# Patient Record
Sex: FEMALE | Race: WHITE | NOT HISPANIC OR LATINO | Employment: UNEMPLOYED | URBAN - METROPOLITAN AREA
[De-identification: names, ages, dates, MRNs, and addresses within clinical notes are randomized per-mention and may not be internally consistent; named-entity substitution may affect disease eponyms.]

---

## 2020-03-22 ENCOUNTER — HOSPITAL ENCOUNTER (EMERGENCY)
Facility: HOSPITAL | Age: 35
Discharge: HOME | End: 2020-03-22
Attending: EMERGENCY MEDICINE
Payer: MEDICAID

## 2020-03-22 VITALS
HEIGHT: 64 IN | BODY MASS INDEX: 23.39 KG/M2 | OXYGEN SATURATION: 100 % | TEMPERATURE: 99 F | HEART RATE: 112 BPM | DIASTOLIC BLOOD PRESSURE: 105 MMHG | WEIGHT: 137 LBS | SYSTOLIC BLOOD PRESSURE: 157 MMHG | RESPIRATION RATE: 18 BRPM

## 2020-03-22 DIAGNOSIS — Z13.9 ENCOUNTER FOR MEDICAL SCREENING EXAMINATION: Primary | ICD-10-CM

## 2020-03-22 DIAGNOSIS — R51.9 NONINTRACTABLE HEADACHE, UNSPECIFIED CHRONICITY PATTERN, UNSPECIFIED HEADACHE TYPE: ICD-10-CM

## 2020-03-22 DIAGNOSIS — I10 ESSENTIAL HYPERTENSION: ICD-10-CM

## 2020-03-22 PROCEDURE — 25000003 PHARM REV CODE 250: Performed by: EMERGENCY MEDICINE

## 2020-03-22 PROCEDURE — 99283 EMERGENCY DEPT VISIT LOW MDM: CPT

## 2020-03-22 RX ORDER — ACETAMINOPHEN 500 MG
1000 TABLET ORAL
Status: COMPLETED | OUTPATIENT
Start: 2020-03-22 | End: 2020-03-22

## 2020-03-22 RX ADMIN — ACETAMINOPHEN 1000 MG: 500 TABLET, FILM COATED ORAL at 09:03

## 2020-03-23 NOTE — ED NOTES
APPEARANCE: Alert, oriented and in no acute distress.  PERIPHERAL VASCULAR: peripheral pulses present. Normal cap refill. No edema. Warm to touch.    RESPIRATORY:Normal rate and effort, breath sounds clear bilaterally throughout chest. Respirations are equal and unlabored no obvious signs of distress.  GASTRO: soft, bowel sounds normal, no tenderness, no abdominal distention.  MUSC: Full ROM. No bony tenderness or soft tissue tenderness.  SKIN: Skin is warm and dry, normal skin turgor, mucous membranes moist.  NEURO: 5/5 strength major flexors/extensors bilaterally. Sensory intact to light touch bilaterally. Imladis coma scale: eyes open spontaneously-4, oriented & converses-5, obeys commands-6.   MENTAL STATUS: awake, alert and aware of environment.  EYE: PERRL, both eyes: pupils brisk and reactive to light. Normal size.

## 2020-03-23 NOTE — ED TRIAGE NOTES
Pt presents to the ER and states mother found her passed out in the living room on the floor and was brought here by EMS.  Pt states she has no problems and her mom is making a big deal out of nothing.  Denies drug or alcohol use.

## 2020-03-24 ENCOUNTER — HOSPITAL ENCOUNTER (EMERGENCY)
Facility: HOSPITAL | Age: 35
Discharge: PSYCHIATRIC OR SUBSTANCE USE FACILITY | End: 2020-03-25
Attending: EMERGENCY MEDICINE
Payer: MEDICAID

## 2020-03-24 DIAGNOSIS — R45.851 SUICIDAL IDEATION: Primary | ICD-10-CM

## 2020-03-24 DIAGNOSIS — T18.9XXA INGESTION OF FOREIGN SUBSTANCE: ICD-10-CM

## 2020-03-24 LAB
ALBUMIN SERPL BCP-MCNC: 4.4 G/DL (ref 3.5–5.2)
ALP SERPL-CCNC: 75 U/L (ref 55–135)
ALT SERPL W/O P-5'-P-CCNC: 17 U/L (ref 10–44)
AMPHET+METHAMPHET UR QL: NEGATIVE
ANION GAP SERPL CALC-SCNC: 17 MMOL/L (ref 8–16)
APAP SERPL-MCNC: <3 UG/ML (ref 10–20)
AST SERPL-CCNC: 18 U/L (ref 10–40)
B-HCG UR QL: NEGATIVE
BACTERIA #/AREA URNS AUTO: NORMAL /HPF
BARBITURATES UR QL SCN>200 NG/ML: NEGATIVE
BASOPHILS # BLD AUTO: 0.03 K/UL (ref 0–0.2)
BASOPHILS NFR BLD: 0.3 % (ref 0–1.9)
BENZODIAZ UR QL SCN>200 NG/ML: NORMAL
BILIRUB SERPL-MCNC: 0.3 MG/DL (ref 0.1–1)
BILIRUB UR QL STRIP: NEGATIVE
BUN SERPL-MCNC: 5 MG/DL (ref 6–20)
BZE UR QL SCN: NEGATIVE
CALCIUM SERPL-MCNC: 10 MG/DL (ref 8.7–10.5)
CANNABINOIDS UR QL SCN: NORMAL
CHLORIDE SERPL-SCNC: 107 MMOL/L (ref 95–110)
CLARITY UR REFRACT.AUTO: CLEAR
CO2 SERPL-SCNC: 20 MMOL/L (ref 23–29)
COLOR UR AUTO: ABNORMAL
CREAT SERPL-MCNC: 1 MG/DL (ref 0.5–1.4)
CREAT UR-MCNC: 49 MG/DL (ref 15–325)
CTP QC/QA: YES
DIFFERENTIAL METHOD: ABNORMAL
EOSINOPHIL # BLD AUTO: 0 K/UL (ref 0–0.5)
EOSINOPHIL NFR BLD: 0.1 % (ref 0–8)
ERYTHROCYTE [DISTWIDTH] IN BLOOD BY AUTOMATED COUNT: 14.1 % (ref 11.5–14.5)
EST. GFR  (AFRICAN AMERICAN): >60 ML/MIN/1.73 M^2
EST. GFR  (NON AFRICAN AMERICAN): >60 ML/MIN/1.73 M^2
ETHANOL SERPL-MCNC: 22 MG/DL
GLUCOSE SERPL-MCNC: 122 MG/DL (ref 70–110)
GLUCOSE UR QL STRIP: NEGATIVE
HCT VFR BLD AUTO: 47.6 % (ref 37–48.5)
HGB BLD-MCNC: 15.2 G/DL (ref 12–16)
HGB UR QL STRIP: ABNORMAL
IMM GRANULOCYTES # BLD AUTO: 0.05 K/UL (ref 0–0.04)
IMM GRANULOCYTES NFR BLD AUTO: 0.4 % (ref 0–0.5)
KETONES UR QL STRIP: NEGATIVE
LEUKOCYTE ESTERASE UR QL STRIP: NEGATIVE
LYMPHOCYTES # BLD AUTO: 1.2 K/UL (ref 1–4.8)
LYMPHOCYTES NFR BLD: 10.1 % (ref 18–48)
MCH RBC QN AUTO: 30.4 PG (ref 27–31)
MCHC RBC AUTO-ENTMCNC: 31.9 G/DL (ref 32–36)
MCV RBC AUTO: 95 FL (ref 82–98)
METHADONE UR QL SCN>300 NG/ML: NEGATIVE
MICROSCOPIC COMMENT: NORMAL
MONOCYTES # BLD AUTO: 0.5 K/UL (ref 0.3–1)
MONOCYTES NFR BLD: 3.9 % (ref 4–15)
NEUTROPHILS # BLD AUTO: 10.1 K/UL (ref 1.8–7.7)
NEUTROPHILS NFR BLD: 85.2 % (ref 38–73)
NITRITE UR QL STRIP: NEGATIVE
NRBC BLD-RTO: 0 /100 WBC
OPIATES UR QL SCN: NORMAL
PCP UR QL SCN>25 NG/ML: NEGATIVE
PH UR STRIP: 7 [PH] (ref 5–8)
PLATELET # BLD AUTO: 373 K/UL (ref 150–350)
PMV BLD AUTO: 8.5 FL (ref 9.2–12.9)
POTASSIUM SERPL-SCNC: 3.2 MMOL/L (ref 3.5–5.1)
PROT SERPL-MCNC: 8.3 G/DL (ref 6–8.4)
PROT UR QL STRIP: NEGATIVE
RBC # BLD AUTO: 5 M/UL (ref 4–5.4)
RBC #/AREA URNS AUTO: 3 /HPF (ref 0–4)
SALICYLATES SERPL-MCNC: <5 MG/DL (ref 15–30)
SODIUM SERPL-SCNC: 144 MMOL/L (ref 136–145)
SP GR UR STRIP: 1.01 (ref 1–1.03)
TOXICOLOGY INFORMATION: NORMAL
TSH SERPL DL<=0.005 MIU/L-ACNC: 1.18 UIU/ML (ref 0.4–4)
URN SPEC COLLECT METH UR: ABNORMAL
WBC # BLD AUTO: 11.85 K/UL (ref 3.9–12.7)
WBC #/AREA URNS AUTO: 0 /HPF (ref 0–5)

## 2020-03-24 PROCEDURE — 84443 ASSAY THYROID STIM HORMONE: CPT

## 2020-03-24 PROCEDURE — 93005 ELECTROCARDIOGRAM TRACING: CPT

## 2020-03-24 PROCEDURE — 80320 DRUG SCREEN QUANTALCOHOLS: CPT

## 2020-03-24 PROCEDURE — 99285 PR EMERGENCY DEPT VISIT,LEVEL V: ICD-10-PCS | Mod: ,,, | Performed by: EMERGENCY MEDICINE

## 2020-03-24 PROCEDURE — 85025 COMPLETE CBC W/AUTO DIFF WBC: CPT

## 2020-03-24 PROCEDURE — 63600175 PHARM REV CODE 636 W HCPCS: Performed by: EMERGENCY MEDICINE

## 2020-03-24 PROCEDURE — 96372 THER/PROPH/DIAG INJ SC/IM: CPT

## 2020-03-24 PROCEDURE — 99285 EMERGENCY DEPT VISIT HI MDM: CPT | Mod: 25

## 2020-03-24 PROCEDURE — 80053 COMPREHEN METABOLIC PANEL: CPT

## 2020-03-24 PROCEDURE — 81025 URINE PREGNANCY TEST: CPT | Performed by: EMERGENCY MEDICINE

## 2020-03-24 PROCEDURE — 81001 URINALYSIS AUTO W/SCOPE: CPT

## 2020-03-24 PROCEDURE — 93010 EKG 12-LEAD: ICD-10-PCS | Mod: ,,, | Performed by: INTERNAL MEDICINE

## 2020-03-24 PROCEDURE — 80307 DRUG TEST PRSMV CHEM ANLYZR: CPT

## 2020-03-24 PROCEDURE — 80329 ANALGESICS NON-OPIOID 1 OR 2: CPT

## 2020-03-24 PROCEDURE — 93010 ELECTROCARDIOGRAM REPORT: CPT | Mod: ,,, | Performed by: INTERNAL MEDICINE

## 2020-03-24 PROCEDURE — 99285 EMERGENCY DEPT VISIT HI MDM: CPT | Mod: ,,, | Performed by: EMERGENCY MEDICINE

## 2020-03-24 PROCEDURE — 25000003 PHARM REV CODE 250: Performed by: EMERGENCY MEDICINE

## 2020-03-24 RX ORDER — LISINOPRIL 20 MG/1
20 TABLET ORAL
Status: COMPLETED | OUTPATIENT
Start: 2020-03-24 | End: 2020-03-24

## 2020-03-24 RX ORDER — HALOPERIDOL 5 MG/ML
5 INJECTION INTRAMUSCULAR
Status: COMPLETED | OUTPATIENT
Start: 2020-03-24 | End: 2020-03-24

## 2020-03-24 RX ORDER — IBUPROFEN 200 MG
1 TABLET ORAL DAILY
Status: DISCONTINUED | OUTPATIENT
Start: 2020-03-25 | End: 2020-03-25 | Stop reason: HOSPADM

## 2020-03-24 RX ADMIN — LISINOPRIL 20 MG: 20 TABLET ORAL at 08:03

## 2020-03-24 RX ADMIN — HALOPERIDOL LACTATE 5 MG: 5 INJECTION, SOLUTION INTRAMUSCULAR at 10:03

## 2020-03-24 RX ADMIN — LORAZEPAM 2 MG: 2 INJECTION INTRAMUSCULAR; INTRAVENOUS at 10:03

## 2020-03-24 NOTE — ED NOTES
Pt remains in paper scrubs, resting in stretcher comfortably. No signs of distress noted. Sitter remains at bedside in direct visual contact, charting per protocol every 15 minutes. Pt remains on cardiac, bp and o2 monitor per md order.  aware of plan of care. Will continue to monitor.

## 2020-03-24 NOTE — ED NOTES
Pt identifiers Sabina Whitten were checked and are correct  LOC: The patient is lethargic and will open her eyes when name is called   APPEARANCE: Pt resting comfortably, in no acute distress, pt is clean and well groomed, clothing properly fastened  SKIN: Skin warm, dry and intact, normal skin turgor, moist mucus membranes Abrasion noted to left shoulder , healing lacerations noted to right wrist   RESPIRATORY: Airway is open and patent, respirations are spontaneous, even and unlabored, normal effort and rate Gurgling auscultated to left upper lung lobe, breath sounds diminished to RUL, RLL, and to LLL  CARDIAC: Normal rate and rhythm, swelling noted to isaac ds , capillary refill < 3 seconds, bilateral radial pulses 2+  ABDOMEN: Soft, nontender, nondistended. Bowel sounds present to all four quad of abd on ausucultation  NEUROLOGIC: PERRL approx 4mm in size with brisk reaction to light , facial expression is symmetrical, patient moving all extremities spontaneously, .  Follows all commands appropriately when asked to squeeze hand   MUSCULOSKELETAL: No obvious deformities.

## 2020-03-24 NOTE — ED NOTES
Care assumed. Patient changed into paper scrubs, personal belongings removed from room, labeled and locked in EMS room. Pt remains on cardica, bp and o2 monitor per MD orders. Sitter at bedside to monitor and record patient activities at least every 15 minutes, while maintaining direct visual contact with patient. The sitter has no personal belongings inside the room.

## 2020-03-24 NOTE — ED TRIAGE NOTES
Pt arrived via stretcher by EMS  EMS reports pt's   one week ago, pt is a former heroine user, pt took and unknown number of unknown pills today , possible injected herself with imitrex and may have drank rubbing alcohol  Pt arrived with emesis in her hair

## 2020-03-24 NOTE — ED PROVIDER NOTES
Encounter Date: 3/22/2020       History     Chief Complaint   Patient presents with    Addiction Problem     Patient presented to the ED with mother. Patient presents to the ED with reports of feeling dehydrated and weak. Mother reports patient with suspected substance problem.     Dehydration    Weakness     HPI   This is a 35 y.o. female who has a past medical history of Anxiety, Fractured coccyx, and Hypertension.     The patient presents to the Emergency Department for a screening exam.  Patient's mother apparently called after she found patient on the floor after she fell off the couch.  Patient states that she was sleeping and rolled off the couch.  Patient denies any alcohol or drug use.  She denies any anxiety or depression. Patient denies any homicidal or suicidal ideations.    She endorses only a mild headache at this time.  No other complaints.      Review of patient's allergies indicates:   Allergen Reactions    Morphine Other (See Comments)     Dizzy and sick to stomach    Tramadol Rash     Past Medical History:   Diagnosis Date    Anxiety     Fractured coccyx     Hypertension      No past surgical history on file.  No family history on file.  Social History     Tobacco Use    Smoking status: Never Smoker   Substance Use Topics    Alcohol use: No    Drug use: No     Review of Systems   Constitutional: Negative for activity change and fever.   Respiratory: Negative for cough.    Gastrointestinal: Negative for abdominal pain, diarrhea and nausea.   Genitourinary: Negative for dysuria.   Neurological: Positive for headaches.   Psychiatric/Behavioral: The patient is not nervous/anxious.    All other systems reviewed and are negative.      Physical Exam     Initial Vitals [03/22/20 2050]   BP Pulse Resp Temp SpO2   (!) 164/101 (!) 112 18 99.4 °F (37.4 °C) 100 %      MAP       --         Physical Exam    Nursing note and vitals reviewed.  Constitutional: She appears well-developed and  well-nourished. She is not diaphoretic. No distress.   HENT:   Head: Normocephalic and atraumatic.   Mouth/Throat: Oropharynx is clear and moist.   Eyes: Conjunctivae are normal. Pupils are equal, round, and reactive to light.   Neck: Normal range of motion. Neck supple.   Cardiovascular: Normal rate, regular rhythm, normal heart sounds and intact distal pulses.   Pulmonary/Chest: Breath sounds normal. No respiratory distress. She has no wheezes. She has no rhonchi. She has no rales.   Abdominal: Soft. Bowel sounds are normal. She exhibits no distension. There is no tenderness. There is no guarding.   Musculoskeletal: Normal range of motion. She exhibits no edema or tenderness.   Neurological: She is alert and oriented to person, place, and time. She has normal strength. GCS score is 15. GCS eye subscore is 4. GCS verbal subscore is 5. GCS motor subscore is 6.   Skin: Skin is warm and dry. Capillary refill takes less than 2 seconds. No rash noted.   Psychiatric: She has a normal mood and affect. Thought content normal.         ED Course   Procedures  Labs Reviewed - No data to display       Imaging Results    None          Medical Decision Making:   Initial Assessment:   This is an emergent evaluation of a 35 y.o.female patient who presents for medical screening exam per mother who called 911 for suspected drug abuse.  The patient appears alert and oriented x3, no acute distress, does not appear intoxicated at this time.  She does not have any homicidal or suicidal ideation, nor does she endorse any other complaints besides a minor headache.  No fever or cough, no other acute illness concerning for infectious etiology.  I will discharge the patient at this time.  Patient can follow-up with her primary care provider or the referred clinic as needed.                                 Clinical Impression:       ICD-10-CM ICD-9-CM   1. Encounter for medical screening examination Z13.9 V82.9   2. Nonintractable headache,  unspecified chronicity pattern, unspecified headache type R51 784.0   3. Essential hypertension I10 401.9             ED Disposition Condition    Discharge Stable        ED Prescriptions     None        Follow-up Information     Follow up With Specialties Details Why Contact Info    [facility] - [facility] Medicine Family Medicine Schedule an appointment as soon as possible for a visit   [address] [place] [address]  [place] [address]  [phone]                                       Aleksander Boggs MD  03/23/20 1553

## 2020-03-24 NOTE — ED PROVIDER NOTES
Encounter Date: 3/24/2020       History     Chief Complaint   Patient presents with    Suicidal     arrived [facility] wtih c/o overdose on unknown pills witnessed by family     HPI      this is a 35-year-old Woman  Who presents with altered mental status.  She was seen in outside emergency department yesterday with headache, and her mother expressed concern about possible substance use.  Patient denied this per the medical record.  Per EMS, the patient was found down at her home, that is unsure if there was trauma.  Fingerstick glucose was within normal limits.  She was arousable to voice, but otherwise unable to provide history, they placed a left lower extremity PIV.  History is limited as the patient is minimally responsive on my evaluation.     I discussed the patient with her mother, Roxana No, phone [phone],  He reported that the patient has a history of prior seizure, history of IV drug use, does not use alcohol much, and that she left a suicide note on the dining room table.  Her   about a week ago, patient's mother is not sure of what but she does report that he had multiple organs fail.  The patient has also expressed suicidal ideation to her brother and stepfather.  Review of patient's allergies indicates:   Allergen Reactions    Morphine Other (See Comments)     Dizzy and sick to stomach    Tramadol Rash     Past Medical History:   Diagnosis Date    Anxiety     Fractured coccyx     Hypertension      Past Surgical History:   Procedure Laterality Date    none       History reviewed. No pertinent family history.  Social History     Tobacco Use    Smoking status: Current Some Day Smoker     Types: Cigars    Smokeless tobacco: Never Used   Substance Use Topics    Alcohol use: No    Drug use: No     Review of Systems   Unable to perform ROS: Mental status change       Physical Exam     Initial Vitals [20 1420]   BP Pulse Resp Temp SpO2   (!) 181/116 (!) 114 16 99.4 °F (37.4  °C) 97 %      MAP       --         Physical Exam    Gen:  Resting with eyes closed, arouses to loud voice, but does not follow commands, makes purposeful movements, NAD, well nourished  Eye: sclera anicteric, pupils are equal reactive, 4 mm to 2 mm. EOMI, no conjunctivitis, no periorbital edema  ENT: NCAT, OP clear, neck supple with FROM, no stridor , there is copious sputum within the oropharynx  CVS: RRR, no m/r/g, distal pulses intact/symmetric  Pulm:  rhonchorous breath sounds in the right upper lung anteriorly, otherwise clear no wheezes, rales or rhonchi, no increased work of breathing  Abd: soft, nontender, nondistended, no organomegaly, no CVAT  Ext: no edema, lesions, rashes, or deformity  Neuro: GCS15, moving all extremities, gait intact  Psych: normal affect, cooperative  ED Course   Procedures  Labs Reviewed   CBC W/ AUTO DIFFERENTIAL - Abnormal; Notable for the following components:       Result Value    Mean Corpuscular Hemoglobin Conc 31.9 (*)     Platelets 373 (*)     MPV 8.5 (*)     Gran # (ANC) 10.1 (*)     Immature Grans (Abs) 0.05 (*)     Gran% 85.2 (*)     Lymph% 10.1 (*)     Mono% 3.9 (*)     All other components within normal limits   COMPREHENSIVE METABOLIC PANEL - Abnormal; Notable for the following components:    Potassium 3.2 (*)     CO2 20 (*)     Glucose 122 (*)     BUN, Bld 5 (*)     Anion Gap 17 (*)     All other components within normal limits   URINALYSIS, REFLEX TO URINE CULTURE - Abnormal; Notable for the following components:    Occult Blood UA 1+ (*)     All other components within normal limits    Narrative:     Preferred Collection Type->Urine, Clean Catch   ALCOHOL,MEDICAL (ETHANOL) - Abnormal; Notable for the following components:    Alcohol, Medical, Serum 22 (*)     All other components within normal limits   ACETAMINOPHEN LEVEL - Abnormal; Notable for the following components:    Acetaminophen (Tylenol), Serum <3.0 (*)     All other components within normal limits    SALICYLATE LEVEL - Abnormal; Notable for the following components:    Salicylate Lvl <5.0 (*)     All other components within normal limits   POCT URINE PREGNANCY - Normal   TSH   DRUG SCREEN PANEL, URINE EMERGENCY    Narrative:     Preferred Collection Type->Urine, Clean Catch   URINALYSIS MICROSCOPIC    Narrative:     Preferred Collection Type->Urine, Clean Catch          Imaging Results          X-Ray Chest AP Portable (Final result)  Result time 03/24/20 17:01:50    Final result by Iron Jarvis Jr., MD (03/24/20 17:01:50)                 Impression:      No significant cardiopulmonary abnormality seen.  Lungs are slightly hypoaerated.      Electronically signed by: Iron Jarvis MD  Date:    03/24/2020  Time:    17:01             Narrative:    EXAMINATION:  XR CHEST AP PORTABLE    CLINICAL HISTORY:  altered mental status, R sided rhonchi;    TECHNIQUE:  Single frontal view of the chest was performed.    COMPARISON:  None    FINDINGS:  Monitoring leads are in place.  Heart size pulmonary vessels are normal.  The lungs slightly hypoaerated.  No confluent consolidation.                               CT Head Without Contrast (Final result)  Result time 03/24/20 16:26:49    Final result by Aleksander Diaz DO (03/24/20 16:26:49)                 Impression:      Unremarkable noncontrast CT head as detailed above specifically without evidence for acute intracranial hemorrhage.  Clinical correlation and further evaluation as warranted.      Electronically signed by: Aleksander Diaz DO  Date:    03/24/2020  Time:    16:26             Narrative:    EXAMINATION:  CT HEAD WITHOUT CONTRAST    CLINICAL HISTORY:  Confusion/delirium, altered LOC, unexplained;    TECHNIQUE:  Multiple sequential 5 mm axial images of the head without contrast.  Coronal and sagittal reformatted imaging from the axial acquisition.    COMPARISON:  None    FINDINGS:  There is no evidence for acute intracranial hemorrhage or sulcal effacement.  The  ventricles are normal in size without hydrocephalus.  There is no midline shift or mass effect.  Few patchy ethmoid air cell opacities bilaterally with small opacity left maxillary antra.                                 Medical Decision Making:   History:   I obtained history from: someone other than patient.       <> Summary of History: Pts mother  Old Medical Records: I decided to obtain old medical records.  Initial Assessment:   This is a 35 year old woman who presents after a possible drug overdose, with somnolence on my exam.  Per history obtained from the patient's mother, she has recently suffered a significant loss with the death of her , and has made overt suicidal statements.  Given this I have placed a PEC.  Patient's exam is suggestive of a sedative hypnotic overdose, not overtly opiate given her normal pupils and normal respiratory effort.  I do not suspect anticholinergic, cholinergic or amphetamine toxidrome by exam.  Plan for labs, including Tylenol and salicylate and ETOH level.  We will maintain cardiac monitoring and obtain an EKG to check for arrhythmogenic etiologies of her altered mental status.  While she does not have obvious head trauma, history is limited and I am concerned about occult trauma, so we will also obtain a CT scan of her head.  Clinical Tests:   Lab Tests: Ordered and Reviewed  Radiological Study: Ordered and Reviewed  ED Management:  Labs are notable for a positive ETOH, negative salicylate or Tylenol level.  Her EKG by my independent interpretation does not show interval abnormalities or an R-wave in AVR.  The patient did slowly metabolize, and became more interview bowl, but did not want to cooperate with my interview.  I have maintained the PEC.  Her head CT by my independent interpretation is negative.  Chest x-ray by my independent interpretation does not show aspiration.  She is medically cleared for psychiatric placement.                             Medically  cleared for psychiatry placement: 3/24/2020  5:38 PM    Clinical Impression:       ICD-10-CM ICD-9-CM   1. Suicidal ideation R45.851 V62.84   2. Ingestion of foreign substance T18.9XXA 938             ED Disposition Condition    Transfer to Psych Facility         ED Prescriptions     None        Follow-up Information    None                                    Negrita White MD  03/24/20 5821

## 2020-03-24 NOTE — ED NOTES
LOC: The patient is awake, alert, and aware of environment. The patient is oriented x 3 and speaking appropriately. Pt arouses to painful stimuli  APPEARANCE: No acute distress noted.   PSYCHOSOCIAL: Patient is calm and cooperative.   SKIN: The skin is warm, dry.   RESPIRATORY: Airway is open and patent. Bilateral chest rise and fall. Respirations are spontaneous, even and unlabored. Normal effort and rate noted. No accessory muscle use noted.   CARDIAC: Patient has a normal rate and rhythm. Denies chest pain or SOB.   ABDOMEN: Soft and non tender to palpation. No distention noted.   URINARY:  Voids independently.   NEUROLOGIC: Tolerating saliva secretions well. Able to follow commands, demonstrating ability to actively and appropriately communicate within context of current conversation. Symmetrical facial muscles. Moving all extremities well. Movement is purposeful.   MUSCULOSKELETAL: No obvious deformities noted.

## 2020-03-25 VITALS
DIASTOLIC BLOOD PRESSURE: 69 MMHG | SYSTOLIC BLOOD PRESSURE: 119 MMHG | RESPIRATION RATE: 18 BRPM | TEMPERATURE: 98 F | BODY MASS INDEX: 23.39 KG/M2 | OXYGEN SATURATION: 100 % | HEIGHT: 64 IN | HEART RATE: 76 BPM | WEIGHT: 137 LBS

## 2020-03-25 PROBLEM — F32.A DEPRESSION WITH SUICIDAL IDEATION: Status: ACTIVE | Noted: 2020-03-25

## 2020-03-25 PROBLEM — I10 HTN (HYPERTENSION): Status: ACTIVE | Noted: 2020-03-25

## 2020-03-25 PROBLEM — E87.6 HYPOKALEMIA: Status: ACTIVE | Noted: 2020-03-25

## 2020-03-25 PROBLEM — R45.851 DEPRESSION WITH SUICIDAL IDEATION: Status: ACTIVE | Noted: 2020-03-25

## 2020-03-25 PROBLEM — F17.200 NICOTINE USE DISORDER: Status: ACTIVE | Noted: 2020-03-25

## 2020-03-25 PROBLEM — Z13.9 ENCOUNTER FOR MEDICAL SCREENING EXAMINATION: Status: ACTIVE | Noted: 2020-03-25

## 2020-03-25 NOTE — ED NOTES
Report received from MAY Milner. Care assumed. Pt in paper scrubs at this time and is resting in stretcher eyes closed, no apparent distress noted. Pt arouses to painful touch. Pt became awake, alert, and stated full name and . Pt oriented to person and place.  Respirations even and unlabored and visible chest rise noted. Patient offered bathroom assistance and denies need at this time. Per pt medical status, patient remains on continuous cardiac monitor, automatic blood pressure cuff, and pulse oximeter; suction at bedside, side rails up x 2, head of bed elevated to 75 degrees, bed low and locked. P.E.C. Signed and in chart. All belongings removed from pt environment unless medically necessary. Sitter at bedside charting Q15 minute checks per policy. Sitter does not have belongings in the room.

## 2020-03-25 NOTE — ED NOTES
Pt transported to [facility] with MAY Obregon and security x2. All belongings, original PEC, and transfer paperwork sent with MAY Obregon. Pt to [facility] in paper scrubs and yellow, non-skid fall risk socks in place. Pt AAOx4, no apparent distress noted. Respirations even and unlabored.

## 2020-03-25 NOTE — ED NOTES
Patient acting out trying to pull smoke detector off wall . And screaming at staff about not wanting to be here. Orders noted.

## 2020-03-25 NOTE — ED NOTES
Pt hypertensive at this time. Pt AAOx4 and stated that she takes Lisinopril 20mg twice a day. Pt denies taking BP medications today. Mindi, psych RN requesting that pt receive BP medication prior to being transported to [facility]. Will speak with MD.

## 2020-03-25 NOTE — ED NOTES
Patient transferred to [facility] with security , myself and nurse Armin with 1 bag of belongings. Patient tried to elope.  called and informed of transfer to  2. DVC maintained.

## 2020-03-25 NOTE — ED NOTES
[facility] with security present. Report was called to nurse FRANKLIN Magana with [facility]. DVC maintained. PEC paperwork sent with patient. Patient called  and informed of pending transfer to [facility]. Vitals stable.

## 2020-06-29 PROBLEM — Z13.9 ENCOUNTER FOR MEDICAL SCREENING EXAMINATION: Status: RESOLVED | Noted: 2020-03-25 | Resolved: 2020-06-29

## 2021-11-22 ENCOUNTER — HOSPITAL ENCOUNTER (EMERGENCY)
Facility: HOSPITAL | Age: 36
Discharge: LEFT BEFORE DISCHARGE | End: 2021-11-22
Attending: EMERGENCY MEDICINE
Payer: MEDICAID

## 2021-11-22 VITALS
SYSTOLIC BLOOD PRESSURE: 137 MMHG | OXYGEN SATURATION: 96 % | TEMPERATURE: 98 F | RESPIRATION RATE: 20 BRPM | WEIGHT: 135 LBS | HEART RATE: 118 BPM | BODY MASS INDEX: 24.84 KG/M2 | DIASTOLIC BLOOD PRESSURE: 97 MMHG | HEIGHT: 62 IN

## 2021-11-22 DIAGNOSIS — M25.469 KNEE SWELLING: ICD-10-CM

## 2021-11-22 DIAGNOSIS — R52 PAIN: ICD-10-CM

## 2021-11-22 DIAGNOSIS — T14.8XXA WOUND INFECTION: Primary | ICD-10-CM

## 2021-11-22 DIAGNOSIS — L02.91 ABSCESS: ICD-10-CM

## 2021-11-22 DIAGNOSIS — L08.9 WOUND INFECTION: Primary | ICD-10-CM

## 2021-11-22 LAB
ALBUMIN SERPL BCP-MCNC: 4.7 G/DL (ref 3.5–5.2)
ALP SERPL-CCNC: 83 U/L (ref 55–135)
ALT SERPL W/O P-5'-P-CCNC: 44 U/L (ref 10–44)
ANION GAP SERPL CALC-SCNC: 11 MMOL/L (ref 8–16)
AST SERPL-CCNC: 26 U/L (ref 10–40)
BASOPHILS # BLD AUTO: 0.07 K/UL (ref 0–0.2)
BASOPHILS NFR BLD: 0.6 % (ref 0–1.9)
BILIRUB SERPL-MCNC: 0.8 MG/DL (ref 0.1–1)
BUN SERPL-MCNC: 9 MG/DL (ref 6–20)
CALCIUM SERPL-MCNC: 9.4 MG/DL (ref 8.7–10.5)
CHLORIDE SERPL-SCNC: 98 MMOL/L (ref 95–110)
CO2 SERPL-SCNC: 28 MMOL/L (ref 23–29)
CREAT SERPL-MCNC: 0.9 MG/DL (ref 0.5–1.4)
CRP SERPL-MCNC: 6.1 MG/L (ref 0–8.2)
DIFFERENTIAL METHOD: ABNORMAL
EOSINOPHIL # BLD AUTO: 0 K/UL (ref 0–0.5)
EOSINOPHIL NFR BLD: 0.3 % (ref 0–8)
ERYTHROCYTE [DISTWIDTH] IN BLOOD BY AUTOMATED COUNT: 13.3 % (ref 11.5–14.5)
ERYTHROCYTE [SEDIMENTATION RATE] IN BLOOD BY WESTERGREN METHOD: 9 MM/HR (ref 0–20)
EST. GFR  (AFRICAN AMERICAN): >60 ML/MIN/1.73 M^2
EST. GFR  (NON AFRICAN AMERICAN): >60 ML/MIN/1.73 M^2
GLUCOSE SERPL-MCNC: 97 MG/DL (ref 70–110)
HCT VFR BLD AUTO: 42.9 % (ref 37–48.5)
HGB BLD-MCNC: 15.2 G/DL (ref 12–16)
IMM GRANULOCYTES # BLD AUTO: 0.04 K/UL (ref 0–0.04)
IMM GRANULOCYTES NFR BLD AUTO: 0.3 % (ref 0–0.5)
LYMPHOCYTES # BLD AUTO: 3.4 K/UL (ref 1–4.8)
LYMPHOCYTES NFR BLD: 29.2 % (ref 18–48)
MCH RBC QN AUTO: 36.5 PG (ref 27–31)
MCHC RBC AUTO-ENTMCNC: 35.4 G/DL (ref 32–36)
MCV RBC AUTO: 103 FL (ref 82–98)
MONOCYTES # BLD AUTO: 0.9 K/UL (ref 0.3–1)
MONOCYTES NFR BLD: 7.8 % (ref 4–15)
NEUTROPHILS # BLD AUTO: 7.3 K/UL (ref 1.8–7.7)
NEUTROPHILS NFR BLD: 61.8 % (ref 38–73)
NRBC BLD-RTO: 0 /100 WBC
PLATELET # BLD AUTO: 360 K/UL (ref 150–450)
PMV BLD AUTO: 8.4 FL (ref 9.2–12.9)
POTASSIUM SERPL-SCNC: 3.3 MMOL/L (ref 3.5–5.1)
PROT SERPL-MCNC: 8.1 G/DL (ref 6–8.4)
RBC # BLD AUTO: 4.17 M/UL (ref 4–5.4)
SODIUM SERPL-SCNC: 137 MMOL/L (ref 136–145)
WBC # BLD AUTO: 11.8 K/UL (ref 3.9–12.7)

## 2021-11-22 PROCEDURE — 86140 C-REACTIVE PROTEIN: CPT | Performed by: EMERGENCY MEDICINE

## 2021-11-22 PROCEDURE — 85025 COMPLETE CBC W/AUTO DIFF WBC: CPT | Performed by: EMERGENCY MEDICINE

## 2021-11-22 PROCEDURE — 63600175 PHARM REV CODE 636 W HCPCS: Performed by: EMERGENCY MEDICINE

## 2021-11-22 PROCEDURE — 80053 COMPREHEN METABOLIC PANEL: CPT | Performed by: EMERGENCY MEDICINE

## 2021-11-22 PROCEDURE — 85652 RBC SED RATE AUTOMATED: CPT | Performed by: EMERGENCY MEDICINE

## 2021-11-22 PROCEDURE — 87040 BLOOD CULTURE FOR BACTERIA: CPT | Performed by: EMERGENCY MEDICINE

## 2021-11-22 PROCEDURE — 99284 EMERGENCY DEPT VISIT MOD MDM: CPT | Mod: 25

## 2021-11-22 PROCEDURE — 25000003 PHARM REV CODE 250: Performed by: EMERGENCY MEDICINE

## 2021-11-22 PROCEDURE — 96374 THER/PROPH/DIAG INJ IV PUSH: CPT

## 2021-11-22 RX ORDER — ALPRAZOLAM 1 MG/1
1 TABLET ORAL 2 TIMES DAILY
COMMUNITY
End: 2021-11-26 | Stop reason: CLARIF

## 2021-11-22 RX ORDER — AMOXICILLIN AND CLAVULANATE POTASSIUM 875; 125 MG/1; MG/1
1 TABLET, FILM COATED ORAL 2 TIMES DAILY
Qty: 14 TABLET | Refills: 0 | Status: ON HOLD | OUTPATIENT
Start: 2021-11-22 | End: 2022-08-16 | Stop reason: SDUPTHER

## 2021-11-22 RX ORDER — DOXYCYCLINE 100 MG/1
100 CAPSULE ORAL 2 TIMES DAILY
Qty: 20 CAPSULE | Refills: 0 | Status: SHIPPED | OUTPATIENT
Start: 2021-11-22 | End: 2021-12-02

## 2021-11-22 RX ORDER — ZOLPIDEM TARTRATE 10 MG/1
5 TABLET ORAL NIGHTLY PRN
COMMUNITY
End: 2021-11-26 | Stop reason: CLARIF

## 2021-11-22 RX ORDER — KETOROLAC TROMETHAMINE 30 MG/ML
10 INJECTION, SOLUTION INTRAMUSCULAR; INTRAVENOUS
Status: COMPLETED | OUTPATIENT
Start: 2021-11-22 | End: 2021-11-22

## 2021-11-22 RX ADMIN — SODIUM CHLORIDE 1000 ML: 0.9 INJECTION, SOLUTION INTRAVENOUS at 10:11

## 2021-11-22 RX ADMIN — KETOROLAC TROMETHAMINE 10 MG: 30 INJECTION, SOLUTION INTRAMUSCULAR; INTRAVENOUS at 10:11

## 2021-11-26 ENCOUNTER — HOSPITAL ENCOUNTER (OUTPATIENT)
Facility: HOSPITAL | Age: 36
Discharge: LEFT BEFORE DISCHARGE | End: 2021-11-27
Attending: EMERGENCY MEDICINE | Admitting: INTERNAL MEDICINE
Payer: MEDICAID

## 2021-11-26 DIAGNOSIS — S81.001S: Primary | ICD-10-CM

## 2021-11-26 DIAGNOSIS — I10 PRIMARY HYPERTENSION: ICD-10-CM

## 2021-11-26 DIAGNOSIS — F33.3 MAJOR DEPRESSIVE DISORDER, RECURRENT, SEVERE WITH PSYCHOTIC FEATURES: ICD-10-CM

## 2021-11-26 DIAGNOSIS — R07.9 CHEST PAIN: ICD-10-CM

## 2021-11-26 DIAGNOSIS — D75.89 MACROCYTOSIS WITHOUT ANEMIA: ICD-10-CM

## 2021-11-26 DIAGNOSIS — I10 HYPERTENSION, UNSPECIFIED TYPE: ICD-10-CM

## 2021-11-26 DIAGNOSIS — L08.9 INFECTED WOUND: ICD-10-CM

## 2021-11-26 DIAGNOSIS — A41.9 SEPSIS, DUE TO UNSPECIFIED ORGANISM, UNSPECIFIED WHETHER ACUTE ORGAN DYSFUNCTION PRESENT: ICD-10-CM

## 2021-11-26 DIAGNOSIS — A41.9 SEPSIS WITHOUT ACUTE ORGAN DYSFUNCTION, DUE TO UNSPECIFIED ORGANISM: ICD-10-CM

## 2021-11-26 DIAGNOSIS — T14.8XXA INFECTED WOUND: ICD-10-CM

## 2021-11-26 DIAGNOSIS — Z51.89 VISIT FOR WOUND CHECK: ICD-10-CM

## 2021-11-26 DIAGNOSIS — F13.20 SEVERE BENZODIAZEPINE USE DISORDER: ICD-10-CM

## 2021-11-26 LAB
ALBUMIN SERPL BCP-MCNC: 3.8 G/DL (ref 3.5–5.2)
ALP SERPL-CCNC: 91 U/L (ref 55–135)
ALT SERPL W/O P-5'-P-CCNC: 17 U/L (ref 10–44)
AMPHET+METHAMPHET UR QL: NEGATIVE
ANION GAP SERPL CALC-SCNC: 13 MMOL/L (ref 8–16)
AST SERPL-CCNC: 19 U/L (ref 10–40)
B-HCG UR QL: NEGATIVE
BARBITURATES UR QL SCN>200 NG/ML: NEGATIVE
BASOPHILS # BLD AUTO: 0.06 K/UL (ref 0–0.2)
BASOPHILS NFR BLD: 0.4 % (ref 0–1.9)
BENZODIAZ UR QL SCN>200 NG/ML: ABNORMAL
BILIRUB SERPL-MCNC: 0.5 MG/DL (ref 0.1–1)
BUN SERPL-MCNC: 11 MG/DL (ref 6–20)
BZE UR QL SCN: NEGATIVE
CALCIUM SERPL-MCNC: 8.8 MG/DL (ref 8.7–10.5)
CANNABINOIDS UR QL SCN: NEGATIVE
CHLORIDE SERPL-SCNC: 103 MMOL/L (ref 95–110)
CHOLEST SERPL-MCNC: 190 MG/DL (ref 120–199)
CHOLEST/HDLC SERPL: 4 {RATIO} (ref 2–5)
CO2 SERPL-SCNC: 19 MMOL/L (ref 23–29)
CREAT SERPL-MCNC: 0.8 MG/DL (ref 0.5–1.4)
CREAT UR-MCNC: 9 MG/DL (ref 15–325)
CRP SERPL-MCNC: 13.7 MG/L (ref 0–8.2)
CTP QC/QA: YES
CTP QC/QA: YES
DIFFERENTIAL METHOD: ABNORMAL
EOSINOPHIL # BLD AUTO: 0.1 K/UL (ref 0–0.5)
EOSINOPHIL NFR BLD: 0.5 % (ref 0–8)
ERYTHROCYTE [DISTWIDTH] IN BLOOD BY AUTOMATED COUNT: 13.1 % (ref 11.5–14.5)
ERYTHROCYTE [SEDIMENTATION RATE] IN BLOOD BY WESTERGREN METHOD: 9 MM/HR (ref 0–20)
EST. GFR  (AFRICAN AMERICAN): >60 ML/MIN/1.73 M^2
EST. GFR  (NON AFRICAN AMERICAN): >60 ML/MIN/1.73 M^2
ESTIMATED AVG GLUCOSE: 91 MG/DL (ref 68–131)
ETHANOL UR-MCNC: <10 MG/DL
FOLATE SERPL-MCNC: 3.6 NG/ML (ref 4–24)
GLUCOSE SERPL-MCNC: 99 MG/DL (ref 70–110)
HBA1C MFR BLD: 4.8 % (ref 4–5.6)
HCT VFR BLD AUTO: 40.7 % (ref 37–48.5)
HDLC SERPL-MCNC: 48 MG/DL (ref 40–75)
HDLC SERPL: 25.3 % (ref 20–50)
HGB BLD-MCNC: 14.3 G/DL (ref 12–16)
IMM GRANULOCYTES # BLD AUTO: 0.05 K/UL (ref 0–0.04)
IMM GRANULOCYTES NFR BLD AUTO: 0.4 % (ref 0–0.5)
LACTATE SERPL-SCNC: 0.7 MMOL/L (ref 0.5–2.2)
LACTATE SERPL-SCNC: 1.4 MMOL/L (ref 0.5–2.2)
LDLC SERPL CALC-MCNC: 129.8 MG/DL (ref 63–159)
LYMPHOCYTES # BLD AUTO: 2.7 K/UL (ref 1–4.8)
LYMPHOCYTES NFR BLD: 19.1 % (ref 18–48)
MCH RBC QN AUTO: 36.1 PG (ref 27–31)
MCHC RBC AUTO-ENTMCNC: 35.1 G/DL (ref 32–36)
MCV RBC AUTO: 103 FL (ref 82–98)
METHADONE UR QL SCN>300 NG/ML: NEGATIVE
MONOCYTES # BLD AUTO: 0.8 K/UL (ref 0.3–1)
MONOCYTES NFR BLD: 5.7 % (ref 4–15)
NEUTROPHILS # BLD AUTO: 10.4 K/UL (ref 1.8–7.7)
NEUTROPHILS NFR BLD: 73.9 % (ref 38–73)
NONHDLC SERPL-MCNC: 142 MG/DL
NRBC BLD-RTO: 0 /100 WBC
OPIATES UR QL SCN: NEGATIVE
PCP UR QL SCN>25 NG/ML: NEGATIVE
PLATELET # BLD AUTO: 396 K/UL (ref 150–450)
PMV BLD AUTO: 8.3 FL (ref 9.2–12.9)
POTASSIUM SERPL-SCNC: 4.1 MMOL/L (ref 3.5–5.1)
PROT SERPL-MCNC: 6.9 G/DL (ref 6–8.4)
RBC # BLD AUTO: 3.96 M/UL (ref 4–5.4)
SARS-COV-2 RDRP RESP QL NAA+PROBE: NEGATIVE
SODIUM SERPL-SCNC: 135 MMOL/L (ref 136–145)
TOXICOLOGY INFORMATION: ABNORMAL
TRIGL SERPL-MCNC: 61 MG/DL (ref 30–150)
VIT B12 SERPL-MCNC: 238 PG/ML (ref 210–950)
WBC # BLD AUTO: 14.12 K/UL (ref 3.9–12.7)

## 2021-11-26 PROCEDURE — 63600175 PHARM REV CODE 636 W HCPCS: Performed by: PHYSICIAN ASSISTANT

## 2021-11-26 PROCEDURE — 86140 C-REACTIVE PROTEIN: CPT | Performed by: PHYSICIAN ASSISTANT

## 2021-11-26 PROCEDURE — 82746 ASSAY OF FOLIC ACID SERUM: CPT | Performed by: STUDENT IN AN ORGANIZED HEALTH CARE EDUCATION/TRAINING PROGRAM

## 2021-11-26 PROCEDURE — U0002 COVID-19 LAB TEST NON-CDC: HCPCS | Performed by: PHYSICIAN ASSISTANT

## 2021-11-26 PROCEDURE — G0378 HOSPITAL OBSERVATION PER HR: HCPCS

## 2021-11-26 PROCEDURE — 96375 TX/PRO/DX INJ NEW DRUG ADDON: CPT

## 2021-11-26 PROCEDURE — 87070 CULTURE OTHR SPECIMN AEROBIC: CPT | Mod: 59 | Performed by: SURGERY

## 2021-11-26 PROCEDURE — 96376 TX/PRO/DX INJ SAME DRUG ADON: CPT

## 2021-11-26 PROCEDURE — 25000003 PHARM REV CODE 250: Performed by: INTERNAL MEDICINE

## 2021-11-26 PROCEDURE — 96366 THER/PROPH/DIAG IV INF ADDON: CPT

## 2021-11-26 PROCEDURE — 25000003 PHARM REV CODE 250: Performed by: STUDENT IN AN ORGANIZED HEALTH CARE EDUCATION/TRAINING PROGRAM

## 2021-11-26 PROCEDURE — 82607 VITAMIN B-12: CPT | Performed by: STUDENT IN AN ORGANIZED HEALTH CARE EDUCATION/TRAINING PROGRAM

## 2021-11-26 PROCEDURE — 96372 THER/PROPH/DIAG INJ SC/IM: CPT | Mod: 59

## 2021-11-26 PROCEDURE — 96365 THER/PROPH/DIAG IV INF INIT: CPT

## 2021-11-26 PROCEDURE — 80053 COMPREHEN METABOLIC PANEL: CPT | Performed by: PHYSICIAN ASSISTANT

## 2021-11-26 PROCEDURE — 87075 CULTR BACTERIA EXCEPT BLOOD: CPT | Mod: 59 | Performed by: SURGERY

## 2021-11-26 PROCEDURE — 99285 EMERGENCY DEPT VISIT HI MDM: CPT | Mod: 25

## 2021-11-26 PROCEDURE — 83605 ASSAY OF LACTIC ACID: CPT | Performed by: PHYSICIAN ASSISTANT

## 2021-11-26 PROCEDURE — 63600175 PHARM REV CODE 636 W HCPCS: Performed by: STUDENT IN AN ORGANIZED HEALTH CARE EDUCATION/TRAINING PROGRAM

## 2021-11-26 PROCEDURE — 81025 URINE PREGNANCY TEST: CPT | Performed by: PHYSICIAN ASSISTANT

## 2021-11-26 PROCEDURE — 25000003 PHARM REV CODE 250: Performed by: PHYSICIAN ASSISTANT

## 2021-11-26 PROCEDURE — 87040 BLOOD CULTURE FOR BACTERIA: CPT | Mod: 59 | Performed by: PHYSICIAN ASSISTANT

## 2021-11-26 PROCEDURE — 83605 ASSAY OF LACTIC ACID: CPT | Mod: 91 | Performed by: EMERGENCY MEDICINE

## 2021-11-26 PROCEDURE — 87186 SC STD MICRODIL/AGAR DIL: CPT | Performed by: SURGERY

## 2021-11-26 PROCEDURE — 83036 HEMOGLOBIN GLYCOSYLATED A1C: CPT | Performed by: STUDENT IN AN ORGANIZED HEALTH CARE EDUCATION/TRAINING PROGRAM

## 2021-11-26 PROCEDURE — 80061 LIPID PANEL: CPT | Performed by: STUDENT IN AN ORGANIZED HEALTH CARE EDUCATION/TRAINING PROGRAM

## 2021-11-26 PROCEDURE — 80307 DRUG TEST PRSMV CHEM ANLYZR: CPT | Performed by: STUDENT IN AN ORGANIZED HEALTH CARE EDUCATION/TRAINING PROGRAM

## 2021-11-26 PROCEDURE — 85025 COMPLETE CBC W/AUTO DIFF WBC: CPT | Performed by: PHYSICIAN ASSISTANT

## 2021-11-26 PROCEDURE — 80074 ACUTE HEPATITIS PANEL: CPT | Performed by: STUDENT IN AN ORGANIZED HEALTH CARE EDUCATION/TRAINING PROGRAM

## 2021-11-26 PROCEDURE — 87077 CULTURE AEROBIC IDENTIFY: CPT | Performed by: SURGERY

## 2021-11-26 PROCEDURE — 85652 RBC SED RATE AUTOMATED: CPT | Performed by: PHYSICIAN ASSISTANT

## 2021-11-26 PROCEDURE — 87389 HIV-1 AG W/HIV-1&-2 AB AG IA: CPT | Performed by: STUDENT IN AN ORGANIZED HEALTH CARE EDUCATION/TRAINING PROGRAM

## 2021-11-26 RX ORDER — IBUPROFEN 200 MG
24 TABLET ORAL
Status: DISCONTINUED | OUTPATIENT
Start: 2021-11-26 | End: 2021-11-27 | Stop reason: HOSPADM

## 2021-11-26 RX ORDER — IBUPROFEN 800 MG/1
800 TABLET ORAL 3 TIMES DAILY
COMMUNITY
End: 2021-11-26 | Stop reason: CLARIF

## 2021-11-26 RX ORDER — NALOXONE HCL 0.4 MG/ML
0.02 VIAL (ML) INJECTION
Status: DISCONTINUED | OUTPATIENT
Start: 2021-11-26 | End: 2021-11-27 | Stop reason: HOSPADM

## 2021-11-26 RX ORDER — SODIUM CHLORIDE 0.9 % (FLUSH) 0.9 %
10 SYRINGE (ML) INJECTION EVERY 12 HOURS PRN
Status: DISCONTINUED | OUTPATIENT
Start: 2021-11-26 | End: 2021-11-27 | Stop reason: HOSPADM

## 2021-11-26 RX ORDER — IBUPROFEN 200 MG
16 TABLET ORAL
Status: DISCONTINUED | OUTPATIENT
Start: 2021-11-26 | End: 2021-11-27 | Stop reason: HOSPADM

## 2021-11-26 RX ORDER — ACETAMINOPHEN 325 MG/1
650 TABLET ORAL EVERY 6 HOURS PRN
Status: DISCONTINUED | OUTPATIENT
Start: 2021-11-26 | End: 2021-11-27 | Stop reason: HOSPADM

## 2021-11-26 RX ORDER — VANCOMYCIN HCL IN 5 % DEXTROSE 1G/250ML
1000 PLASTIC BAG, INJECTION (ML) INTRAVENOUS
Status: DISCONTINUED | OUTPATIENT
Start: 2021-11-27 | End: 2021-11-27 | Stop reason: HOSPADM

## 2021-11-26 RX ORDER — METRONIDAZOLE 500 MG/1
500 TABLET ORAL EVERY 8 HOURS
Status: DISCONTINUED | OUTPATIENT
Start: 2021-11-26 | End: 2021-11-27 | Stop reason: HOSPADM

## 2021-11-26 RX ORDER — ENOXAPARIN SODIUM 100 MG/ML
40 INJECTION SUBCUTANEOUS EVERY 24 HOURS
Status: DISCONTINUED | OUTPATIENT
Start: 2021-11-26 | End: 2021-11-27 | Stop reason: HOSPADM

## 2021-11-26 RX ORDER — CEFEPIME HYDROCHLORIDE 1 G/50ML
2 INJECTION, SOLUTION INTRAVENOUS
Status: DISCONTINUED | OUTPATIENT
Start: 2021-11-26 | End: 2021-11-27 | Stop reason: HOSPADM

## 2021-11-26 RX ORDER — GLUCAGON 1 MG
1 KIT INJECTION
Status: DISCONTINUED | OUTPATIENT
Start: 2021-11-26 | End: 2021-11-27 | Stop reason: HOSPADM

## 2021-11-26 RX ORDER — BIOTIN 10 MG
1 TABLET ORAL DAILY
COMMUNITY
End: 2021-11-26 | Stop reason: CLARIF

## 2021-11-26 RX ORDER — KETOROLAC TROMETHAMINE 30 MG/ML
15 INJECTION, SOLUTION INTRAMUSCULAR; INTRAVENOUS
Status: COMPLETED | OUTPATIENT
Start: 2021-11-26 | End: 2021-11-26

## 2021-11-26 RX ADMIN — KETOROLAC TROMETHAMINE 15 MG: 30 INJECTION, SOLUTION INTRAMUSCULAR; INTRAVENOUS at 08:11

## 2021-11-26 RX ADMIN — PIPERACILLIN AND TAZOBACTAM 4.5 G: 4; .5 INJECTION, POWDER, FOR SOLUTION INTRAVENOUS at 08:11

## 2021-11-26 RX ADMIN — CEFEPIME HYDROCHLORIDE 2 G: 2 INJECTION, SOLUTION INTRAVENOUS at 08:11

## 2021-11-26 RX ADMIN — METRONIDAZOLE 500 MG: 500 TABLET ORAL at 09:11

## 2021-11-26 RX ADMIN — VANCOMYCIN HYDROCHLORIDE 1500 MG: 1.5 INJECTION, POWDER, LYOPHILIZED, FOR SOLUTION INTRAVENOUS at 02:11

## 2021-11-26 RX ADMIN — SODIUM CHLORIDE 1000 ML: 0.9 INJECTION, SOLUTION INTRAVENOUS at 08:11

## 2021-11-26 RX ADMIN — CEFEPIME HYDROCHLORIDE 2 G: 2 INJECTION, SOLUTION INTRAVENOUS at 12:11

## 2021-11-26 RX ADMIN — ENOXAPARIN SODIUM 40 MG: 100 INJECTION SUBCUTANEOUS at 04:11

## 2021-11-26 RX ADMIN — METRONIDAZOLE 500 MG: 500 TABLET ORAL at 02:11

## 2021-11-26 RX ADMIN — ACETAMINOPHEN 650 MG: 325 TABLET ORAL at 12:11

## 2021-11-26 RX ADMIN — COLLAGENASE SANTYL: 250 OINTMENT TOPICAL at 04:11

## 2021-11-27 VITALS
BODY MASS INDEX: 26.98 KG/M2 | SYSTOLIC BLOOD PRESSURE: 121 MMHG | DIASTOLIC BLOOD PRESSURE: 82 MMHG | OXYGEN SATURATION: 98 % | HEIGHT: 62 IN | RESPIRATION RATE: 20 BRPM | TEMPERATURE: 98 F | HEART RATE: 79 BPM | WEIGHT: 146.63 LBS

## 2021-11-27 LAB
BACTERIA BLD CULT: NORMAL
BACTERIA BLD CULT: NORMAL

## 2021-11-27 PROCEDURE — 25000003 PHARM REV CODE 250: Performed by: STUDENT IN AN ORGANIZED HEALTH CARE EDUCATION/TRAINING PROGRAM

## 2021-11-27 PROCEDURE — 63600175 PHARM REV CODE 636 W HCPCS: Performed by: STUDENT IN AN ORGANIZED HEALTH CARE EDUCATION/TRAINING PROGRAM

## 2021-11-27 PROCEDURE — 96376 TX/PRO/DX INJ SAME DRUG ADON: CPT

## 2021-11-27 PROCEDURE — G0378 HOSPITAL OBSERVATION PER HR: HCPCS

## 2021-11-27 RX ORDER — FOLIC ACID 1 MG/1
1 TABLET ORAL DAILY
Status: DISCONTINUED | OUTPATIENT
Start: 2021-11-27 | End: 2021-11-27 | Stop reason: HOSPADM

## 2021-11-27 RX ORDER — LANOLIN ALCOHOL/MO/W.PET/CERES
1000 CREAM (GRAM) TOPICAL DAILY
Status: DISCONTINUED | OUTPATIENT
Start: 2021-11-29 | End: 2021-11-27 | Stop reason: HOSPADM

## 2021-11-27 RX ORDER — CYANOCOBALAMIN 1000 UG/ML
1000 INJECTION, SOLUTION INTRAMUSCULAR; SUBCUTANEOUS DAILY
Status: DISCONTINUED | OUTPATIENT
Start: 2021-11-27 | End: 2021-11-27 | Stop reason: HOSPADM

## 2021-11-27 RX ADMIN — VANCOMYCIN HYDROCHLORIDE 1000 MG: 1 INJECTION, POWDER, LYOPHILIZED, FOR SOLUTION INTRAVENOUS at 02:11

## 2021-11-27 RX ADMIN — CEFEPIME HYDROCHLORIDE 2 G: 2 INJECTION, SOLUTION INTRAVENOUS at 05:11

## 2021-11-27 RX ADMIN — METRONIDAZOLE 500 MG: 500 TABLET ORAL at 05:11

## 2021-11-29 LAB — HIV 1+2 AB+HIV1 P24 AG SERPL QL IA: NEGATIVE

## 2021-11-30 ENCOUNTER — TELEPHONE (OUTPATIENT)
Dept: ADMINISTRATIVE | Facility: OTHER | Age: 36
End: 2021-11-30
Payer: MEDICAID

## 2021-11-30 LAB
BACTERIA SPEC AEROBE CULT: ABNORMAL
BACTERIA SPEC ANAEROBE CULT: NORMAL
HAV IGM SERPL QL IA: NEGATIVE
HBV CORE IGM SERPL QL IA: NEGATIVE
HBV SURFACE AG SERPL QL IA: NEGATIVE
HCV AB SERPL QL IA: POSITIVE

## 2021-12-01 LAB
BACTERIA BLD CULT: NORMAL
BACTERIA BLD CULT: NORMAL

## 2021-12-07 ENCOUNTER — TELEPHONE (OUTPATIENT)
Dept: HEPATOLOGY | Facility: CLINIC | Age: 36
End: 2021-12-07
Payer: MEDICAID

## 2022-08-14 ENCOUNTER — HOSPITAL ENCOUNTER (INPATIENT)
Facility: HOSPITAL | Age: 37
LOS: 2 days | Discharge: HOME | DRG: 101 | End: 2022-08-16
Attending: EMERGENCY MEDICINE | Admitting: FAMILY MEDICINE
Payer: MEDICAID

## 2022-08-14 DIAGNOSIS — F13.20 SEVERE BENZODIAZEPINE USE DISORDER: ICD-10-CM

## 2022-08-14 DIAGNOSIS — R56.9 SEIZURE: ICD-10-CM

## 2022-08-14 DIAGNOSIS — R07.9 CHEST PAIN: ICD-10-CM

## 2022-08-14 DIAGNOSIS — E03.9 HYPOTHYROIDISM, UNSPECIFIED TYPE: Primary | ICD-10-CM

## 2022-08-14 DIAGNOSIS — R41.82 AMS (ALTERED MENTAL STATUS): ICD-10-CM

## 2022-08-14 DIAGNOSIS — R56.9 SEIZURES: ICD-10-CM

## 2022-08-14 DIAGNOSIS — G93.40 ENCEPHALOPATHY: ICD-10-CM

## 2022-08-14 LAB
ALBUMIN SERPL BCP-MCNC: 4.2 G/DL (ref 3.5–5.2)
ALP SERPL-CCNC: 64 U/L (ref 55–135)
ALT SERPL W/O P-5'-P-CCNC: 14 U/L (ref 10–44)
ANION GAP SERPL CALC-SCNC: 13 MMOL/L (ref 8–16)
APAP SERPL-MCNC: <3 UG/ML (ref 10–20)
AST SERPL-CCNC: 23 U/L (ref 10–40)
BASOPHILS # BLD AUTO: 0.03 K/UL (ref 0–0.2)
BASOPHILS NFR BLD: 0.2 % (ref 0–1.9)
BILIRUB SERPL-MCNC: 1.3 MG/DL (ref 0.1–1)
BUN SERPL-MCNC: 8 MG/DL (ref 6–20)
CALCIUM SERPL-MCNC: 9.4 MG/DL (ref 8.7–10.5)
CHLORIDE SERPL-SCNC: 104 MMOL/L (ref 95–110)
CO2 SERPL-SCNC: 18 MMOL/L (ref 23–29)
CREAT SERPL-MCNC: 0.8 MG/DL (ref 0.5–1.4)
DIFFERENTIAL METHOD: ABNORMAL
EOSINOPHIL # BLD AUTO: 0 K/UL (ref 0–0.5)
EOSINOPHIL NFR BLD: 0 % (ref 0–8)
ERYTHROCYTE [DISTWIDTH] IN BLOOD BY AUTOMATED COUNT: 13.8 % (ref 11.5–14.5)
EST. GFR  (NO RACE VARIABLE): >60 ML/MIN/1.73 M^2
ETHANOL SERPL-MCNC: <10 MG/DL
GLUCOSE SERPL-MCNC: 93 MG/DL (ref 70–110)
HCT VFR BLD AUTO: 42.8 % (ref 37–48.5)
HGB BLD-MCNC: 14.8 G/DL (ref 12–16)
IMM GRANULOCYTES # BLD AUTO: 0.08 K/UL (ref 0–0.04)
IMM GRANULOCYTES NFR BLD AUTO: 0.6 % (ref 0–0.5)
LYMPHOCYTES # BLD AUTO: 1.3 K/UL (ref 1–4.8)
LYMPHOCYTES NFR BLD: 8.7 % (ref 18–48)
MAGNESIUM SERPL-MCNC: 1.7 MG/DL (ref 1.6–2.6)
MCH RBC QN AUTO: 36.5 PG (ref 27–31)
MCHC RBC AUTO-ENTMCNC: 34.6 G/DL (ref 32–36)
MCV RBC AUTO: 105 FL (ref 82–98)
MONOCYTES # BLD AUTO: 0.9 K/UL (ref 0.3–1)
MONOCYTES NFR BLD: 5.9 % (ref 4–15)
NEUTROPHILS # BLD AUTO: 12.2 K/UL (ref 1.8–7.7)
NEUTROPHILS NFR BLD: 84.6 % (ref 38–73)
NRBC BLD-RTO: 0 /100 WBC
PLATELET # BLD AUTO: 377 K/UL (ref 150–450)
PMV BLD AUTO: 8.8 FL (ref 9.2–12.9)
POTASSIUM SERPL-SCNC: 3.7 MMOL/L (ref 3.5–5.1)
PROT SERPL-MCNC: 7.6 G/DL (ref 6–8.4)
RBC # BLD AUTO: 4.06 M/UL (ref 4–5.4)
SARS-COV-2 RDRP RESP QL NAA+PROBE: NEGATIVE
SODIUM SERPL-SCNC: 135 MMOL/L (ref 136–145)
T4 FREE SERPL-MCNC: 0.57 NG/DL (ref 0.71–1.51)
TROPONIN I SERPL DL<=0.01 NG/ML-MCNC: 0.01 NG/ML (ref 0–0.03)
TSH SERPL DL<=0.005 MIU/L-ACNC: 26.79 UIU/ML (ref 0.4–4)
WBC # BLD AUTO: 14.45 K/UL (ref 3.9–12.7)

## 2022-08-14 PROCEDURE — 84443 ASSAY THYROID STIM HORMONE: CPT | Performed by: NURSE PRACTITIONER

## 2022-08-14 PROCEDURE — 82077 ASSAY SPEC XCP UR&BREATH IA: CPT | Performed by: NURSE PRACTITIONER

## 2022-08-14 PROCEDURE — 80143 DRUG ASSAY ACETAMINOPHEN: CPT | Performed by: NURSE PRACTITIONER

## 2022-08-14 PROCEDURE — 96374 THER/PROPH/DIAG INJ IV PUSH: CPT

## 2022-08-14 PROCEDURE — 63600175 PHARM REV CODE 636 W HCPCS: Performed by: EMERGENCY MEDICINE

## 2022-08-14 PROCEDURE — U0002 COVID-19 LAB TEST NON-CDC: HCPCS | Performed by: NURSE PRACTITIONER

## 2022-08-14 PROCEDURE — 84484 ASSAY OF TROPONIN QUANT: CPT | Performed by: EMERGENCY MEDICINE

## 2022-08-14 PROCEDURE — 85025 COMPLETE CBC W/AUTO DIFF WBC: CPT | Performed by: NURSE PRACTITIONER

## 2022-08-14 PROCEDURE — 12000002 HC ACUTE/MED SURGE SEMI-PRIVATE ROOM

## 2022-08-14 PROCEDURE — 96372 THER/PROPH/DIAG INJ SC/IM: CPT | Performed by: NURSE PRACTITIONER

## 2022-08-14 PROCEDURE — 93010 ELECTROCARDIOGRAM REPORT: CPT | Mod: ,,, | Performed by: INTERNAL MEDICINE

## 2022-08-14 PROCEDURE — 83735 ASSAY OF MAGNESIUM: CPT | Performed by: NURSE PRACTITIONER

## 2022-08-14 PROCEDURE — 93010 EKG 12-LEAD: ICD-10-PCS | Mod: ,,, | Performed by: INTERNAL MEDICINE

## 2022-08-14 PROCEDURE — 84439 ASSAY OF FREE THYROXINE: CPT | Performed by: NURSE PRACTITIONER

## 2022-08-14 PROCEDURE — 80053 COMPREHEN METABOLIC PANEL: CPT | Performed by: NURSE PRACTITIONER

## 2022-08-14 PROCEDURE — 93005 ELECTROCARDIOGRAM TRACING: CPT

## 2022-08-14 PROCEDURE — 63600175 PHARM REV CODE 636 W HCPCS: Performed by: NURSE PRACTITIONER

## 2022-08-14 PROCEDURE — 99285 EMERGENCY DEPT VISIT HI MDM: CPT | Mod: 25

## 2022-08-14 RX ORDER — IPRATROPIUM BROMIDE AND ALBUTEROL SULFATE 2.5; .5 MG/3ML; MG/3ML
3 SOLUTION RESPIRATORY (INHALATION) EVERY 4 HOURS PRN
Status: DISCONTINUED | OUTPATIENT
Start: 2022-08-15 | End: 2022-08-16 | Stop reason: HOSPADM

## 2022-08-14 RX ORDER — ZOLPIDEM TARTRATE 10 MG/1
10 TABLET ORAL NIGHTLY
Status: ON HOLD | COMMUNITY
Start: 2022-07-22 | End: 2022-08-16 | Stop reason: HOSPADM

## 2022-08-14 RX ORDER — LEVETIRACETAM 500 MG/5ML
1000 INJECTION, SOLUTION, CONCENTRATE INTRAVENOUS ONCE
Status: COMPLETED | OUTPATIENT
Start: 2022-08-14 | End: 2022-08-14

## 2022-08-14 RX ORDER — IBUPROFEN 200 MG
24 TABLET ORAL
Status: DISCONTINUED | OUTPATIENT
Start: 2022-08-15 | End: 2022-08-16 | Stop reason: HOSPADM

## 2022-08-14 RX ORDER — NALOXONE HCL 0.4 MG/ML
0.02 VIAL (ML) INJECTION
Status: DISCONTINUED | OUTPATIENT
Start: 2022-08-15 | End: 2022-08-16 | Stop reason: HOSPADM

## 2022-08-14 RX ORDER — PROCHLORPERAZINE EDISYLATE 5 MG/ML
5 INJECTION INTRAMUSCULAR; INTRAVENOUS EVERY 6 HOURS PRN
Status: DISCONTINUED | OUTPATIENT
Start: 2022-08-15 | End: 2022-08-16 | Stop reason: HOSPADM

## 2022-08-14 RX ORDER — ALPRAZOLAM 1 MG/1
1 TABLET ORAL 3 TIMES DAILY
COMMUNITY
Start: 2022-07-20

## 2022-08-14 RX ORDER — LORAZEPAM 2 MG/ML
2 INJECTION INTRAMUSCULAR
Status: DISCONTINUED | OUTPATIENT
Start: 2022-08-15 | End: 2022-08-16 | Stop reason: HOSPADM

## 2022-08-14 RX ORDER — POLYETHYLENE GLYCOL 3350 17 G/17G
17 POWDER, FOR SOLUTION ORAL DAILY PRN
Status: DISCONTINUED | OUTPATIENT
Start: 2022-08-15 | End: 2022-08-16 | Stop reason: HOSPADM

## 2022-08-14 RX ORDER — TALC
6 POWDER (GRAM) TOPICAL NIGHTLY PRN
Status: DISCONTINUED | OUTPATIENT
Start: 2022-08-15 | End: 2022-08-16 | Stop reason: HOSPADM

## 2022-08-14 RX ORDER — GLUCAGON 1 MG
1 KIT INJECTION
Status: DISCONTINUED | OUTPATIENT
Start: 2022-08-15 | End: 2022-08-16 | Stop reason: HOSPADM

## 2022-08-14 RX ORDER — HALOPERIDOL 5 MG/ML
5 INJECTION INTRAMUSCULAR
Status: COMPLETED | OUTPATIENT
Start: 2022-08-14 | End: 2022-08-14

## 2022-08-14 RX ORDER — ACETAMINOPHEN 325 MG/1
650 TABLET ORAL EVERY 8 HOURS PRN
Status: DISCONTINUED | OUTPATIENT
Start: 2022-08-15 | End: 2022-08-16 | Stop reason: HOSPADM

## 2022-08-14 RX ORDER — SODIUM CHLORIDE 9 MG/ML
INJECTION, SOLUTION INTRAVENOUS CONTINUOUS
Status: ACTIVE | OUTPATIENT
Start: 2022-08-15 | End: 2022-08-15

## 2022-08-14 RX ORDER — IBUPROFEN 200 MG
16 TABLET ORAL
Status: DISCONTINUED | OUTPATIENT
Start: 2022-08-15 | End: 2022-08-16 | Stop reason: HOSPADM

## 2022-08-14 RX ORDER — DIPHENHYDRAMINE HYDROCHLORIDE 50 MG/ML
50 INJECTION INTRAMUSCULAR; INTRAVENOUS
Status: COMPLETED | OUTPATIENT
Start: 2022-08-14 | End: 2022-08-14

## 2022-08-14 RX ORDER — SODIUM CHLORIDE 0.9 % (FLUSH) 0.9 %
10 SYRINGE (ML) INJECTION EVERY 8 HOURS
Status: DISCONTINUED | OUTPATIENT
Start: 2022-08-15 | End: 2022-08-16 | Stop reason: HOSPADM

## 2022-08-14 RX ORDER — LORAZEPAM 2 MG/ML
2 INJECTION INTRAMUSCULAR
Status: COMPLETED | OUTPATIENT
Start: 2022-08-14 | End: 2022-08-14

## 2022-08-14 RX ORDER — ONDANSETRON 2 MG/ML
4 INJECTION INTRAMUSCULAR; INTRAVENOUS EVERY 8 HOURS PRN
Status: DISCONTINUED | OUTPATIENT
Start: 2022-08-15 | End: 2022-08-16 | Stop reason: HOSPADM

## 2022-08-14 RX ORDER — LEVETIRACETAM 500 MG/5ML
500 INJECTION, SOLUTION, CONCENTRATE INTRAVENOUS EVERY 12 HOURS
Status: DISCONTINUED | OUTPATIENT
Start: 2022-08-15 | End: 2022-08-15

## 2022-08-14 RX ADMIN — HALOPERIDOL LACTATE 5 MG: 5 INJECTION, SOLUTION INTRAMUSCULAR at 06:08

## 2022-08-14 RX ADMIN — DIPHENHYDRAMINE HYDROCHLORIDE 50 MG: 50 INJECTION, SOLUTION INTRAMUSCULAR; INTRAVENOUS at 06:08

## 2022-08-14 RX ADMIN — LEVETIRACETAM 1000 MG: 100 INJECTION, SOLUTION INTRAVENOUS at 07:08

## 2022-08-14 RX ADMIN — LORAZEPAM 2 MG: 2 INJECTION INTRAMUSCULAR; INTRAVENOUS at 06:08

## 2022-08-14 NOTE — ED NOTES
Patient walked into triage room disoriented and grabbing things on the wall. Patient then walked into the waiting room and started grabbing things around nurse neck. Guarded patient while standing and pressed alert button patient then became stiff, and active seziure noted. Guided patient down to the floor and protected her head from hitting the floor, while her spouse was yelling that this was going to happen. Assistance came and alerted them to get help and a gurney. Patient did not hit her head on the floor. Patient was safely placed into bed and wheeled to the back. While patient was in gurey wheeling to the back, she had large involuntary movements in the bed and jerked her head and body.. 4-5 people were at her side the entire transport to assit with protecting her from coming out of the bed. Glucose assessed was 106. MD at bedside and IV started

## 2022-08-14 NOTE — ED NOTES
2nd RN unsuccessful with blood draw. MD informed of unsuccessful blood draw. Lab called to bedside

## 2022-08-14 NOTE — ED NOTES
Assumed care of pt. Dr austin and  at bedside upon assuming care of patient. Pt acting abnormal and grabbing at staff's arms and trying to get out of bed. Dystonia like movement noted. Pt fiance at bedside and being wanded by security. Fiance deescalated by dr. Austin and  and now calm and cooperative. Per kelsey, pt has been having seizures for days and acting abnormal.

## 2022-08-14 NOTE — Clinical Note
Diagnosis: Hypothyroidism, unspecified type [[id number]]   Admitting Provider:: ZAFAR VICENTE [[id number]]   Future Attending Provider: ZAFAR VICENTE [[id number]]   Reason for IP Medical Treatment  (Clinical interventions that can only be accomplished in the IP setting? ) :: Neurology consult, hypothyroidism   Estimated Length of Stay:: 2 midnights   I certify that Inpatient services for greater than or equal to 2 midnights are medically necessary:: Yes   Plans for Post-Acute care--if anticipated (pick the single best option):: A. No post acute care anticipated at this time

## 2022-08-15 ENCOUNTER — CLINICAL SUPPORT (OUTPATIENT)
Dept: SMOKING CESSATION | Facility: CLINIC | Age: 37
End: 2022-08-15

## 2022-08-15 DIAGNOSIS — F17.210 CIGARETTE SMOKER: Primary | ICD-10-CM

## 2022-08-15 PROBLEM — N39.0 URINARY TRACT INFECTION WITHOUT HEMATURIA: Status: ACTIVE | Noted: 2022-08-15

## 2022-08-15 LAB
ALBUMIN SERPL BCP-MCNC: 4 G/DL (ref 3.5–5.2)
ALP SERPL-CCNC: 58 U/L (ref 55–135)
ALT SERPL W/O P-5'-P-CCNC: 13 U/L (ref 10–44)
AMPHET+METHAMPHET UR QL: ABNORMAL
ANION GAP SERPL CALC-SCNC: 10 MMOL/L (ref 8–16)
AST SERPL-CCNC: 27 U/L (ref 10–40)
B-HCG UR QL: NEGATIVE
BACTERIA #/AREA URNS HPF: ABNORMAL /HPF
BARBITURATES UR QL SCN>200 NG/ML: NEGATIVE
BASOPHILS # BLD AUTO: 0.07 K/UL (ref 0–0.2)
BASOPHILS NFR BLD: 0.5 % (ref 0–1.9)
BENZODIAZ UR QL SCN>200 NG/ML: NEGATIVE
BILIRUB SERPL-MCNC: 1.6 MG/DL (ref 0.1–1)
BILIRUB UR QL STRIP: NEGATIVE
BUN SERPL-MCNC: 9 MG/DL (ref 6–20)
BZE UR QL SCN: NEGATIVE
CALCIUM SERPL-MCNC: 9.2 MG/DL (ref 8.7–10.5)
CANNABINOIDS UR QL SCN: ABNORMAL
CHLORIDE SERPL-SCNC: 105 MMOL/L (ref 95–110)
CLARITY UR: ABNORMAL
CO2 SERPL-SCNC: 21 MMOL/L (ref 23–29)
COLOR UR: ABNORMAL
CREAT SERPL-MCNC: 0.8 MG/DL (ref 0.5–1.4)
CREAT UR-MCNC: 295.9 MG/DL (ref 15–325)
DIFFERENTIAL METHOD: ABNORMAL
EOSINOPHIL # BLD AUTO: 0 K/UL (ref 0–0.5)
EOSINOPHIL NFR BLD: 0.3 % (ref 0–8)
ERYTHROCYTE [DISTWIDTH] IN BLOOD BY AUTOMATED COUNT: 14.1 % (ref 11.5–14.5)
EST. GFR  (NO RACE VARIABLE): >60 ML/MIN/1.73 M^2
GLUCOSE SERPL-MCNC: 76 MG/DL (ref 70–110)
GLUCOSE UR QL STRIP: NEGATIVE
HCT VFR BLD AUTO: 41 % (ref 37–48.5)
HGB BLD-MCNC: 14.2 G/DL (ref 12–16)
HGB UR QL STRIP: ABNORMAL
IMM GRANULOCYTES # BLD AUTO: 0.05 K/UL (ref 0–0.04)
IMM GRANULOCYTES NFR BLD AUTO: 0.4 % (ref 0–0.5)
KETONES UR QL STRIP: ABNORMAL
LEUKOCYTE ESTERASE UR QL STRIP: ABNORMAL
LYMPHOCYTES # BLD AUTO: 3.8 K/UL (ref 1–4.8)
LYMPHOCYTES NFR BLD: 28.5 % (ref 18–48)
MAGNESIUM SERPL-MCNC: 1.8 MG/DL (ref 1.6–2.6)
MCH RBC QN AUTO: 36 PG (ref 27–31)
MCHC RBC AUTO-ENTMCNC: 34.6 G/DL (ref 32–36)
MCV RBC AUTO: 104 FL (ref 82–98)
METHADONE UR QL SCN>300 NG/ML: NEGATIVE
MICROSCOPIC COMMENT: ABNORMAL
MONOCYTES # BLD AUTO: 1 K/UL (ref 0.3–1)
MONOCYTES NFR BLD: 7.2 % (ref 4–15)
NEUTROPHILS # BLD AUTO: 8.4 K/UL (ref 1.8–7.7)
NEUTROPHILS NFR BLD: 63.1 % (ref 38–73)
NITRITE UR QL STRIP: NEGATIVE
NRBC BLD-RTO: 0 /100 WBC
OPIATES UR QL SCN: NEGATIVE
PCP UR QL SCN>25 NG/ML: NEGATIVE
PH UR STRIP: 7 [PH] (ref 5–8)
PLATELET # BLD AUTO: 439 K/UL (ref 150–450)
PMV BLD AUTO: 9.1 FL (ref 9.2–12.9)
POTASSIUM SERPL-SCNC: 3.1 MMOL/L (ref 3.5–5.1)
PROT SERPL-MCNC: 7 G/DL (ref 6–8.4)
PROT UR QL STRIP: NEGATIVE
RBC # BLD AUTO: 3.94 M/UL (ref 4–5.4)
RBC #/AREA URNS HPF: 3 /HPF (ref 0–4)
SODIUM SERPL-SCNC: 136 MMOL/L (ref 136–145)
SP GR UR STRIP: 1.03 (ref 1–1.03)
TOXICOLOGY INFORMATION: ABNORMAL
URN SPEC COLLECT METH UR: ABNORMAL
UROBILINOGEN UR STRIP-ACNC: NEGATIVE EU/DL
WBC # BLD AUTO: 13.32 K/UL (ref 3.9–12.7)
WBC #/AREA URNS HPF: 10 /HPF (ref 0–5)

## 2022-08-15 PROCEDURE — 80053 COMPREHEN METABOLIC PANEL: CPT | Performed by: NURSE PRACTITIONER

## 2022-08-15 PROCEDURE — 83036 HEMOGLOBIN GLYCOSYLATED A1C: CPT | Performed by: NURSE PRACTITIONER

## 2022-08-15 PROCEDURE — 63600175 PHARM REV CODE 636 W HCPCS: Performed by: INTERNAL MEDICINE

## 2022-08-15 PROCEDURE — A4216 STERILE WATER/SALINE, 10 ML: HCPCS | Performed by: NURSE PRACTITIONER

## 2022-08-15 PROCEDURE — 95819 PR EEG,W/AWAKE & ASLEEP RECORD: ICD-10-PCS | Mod: 26,,, | Performed by: PSYCHIATRY & NEUROLOGY

## 2022-08-15 PROCEDURE — 81025 URINE PREGNANCY TEST: CPT | Performed by: EMERGENCY MEDICINE

## 2022-08-15 PROCEDURE — 25000003 PHARM REV CODE 250: Performed by: NURSE PRACTITIONER

## 2022-08-15 PROCEDURE — 36415 COLL VENOUS BLD VENIPUNCTURE: CPT | Performed by: NURSE PRACTITIONER

## 2022-08-15 PROCEDURE — 81000 URINALYSIS NONAUTO W/SCOPE: CPT | Mod: 59 | Performed by: EMERGENCY MEDICINE

## 2022-08-15 PROCEDURE — 25000003 PHARM REV CODE 250: Performed by: INTERNAL MEDICINE

## 2022-08-15 PROCEDURE — 95819 EEG AWAKE AND ASLEEP: CPT

## 2022-08-15 PROCEDURE — 85025 COMPLETE CBC W/AUTO DIFF WBC: CPT | Performed by: NURSE PRACTITIONER

## 2022-08-15 PROCEDURE — 99900035 HC TECH TIME PER 15 MIN (STAT)

## 2022-08-15 PROCEDURE — 95819 EEG AWAKE AND ASLEEP: CPT | Mod: 26,,, | Performed by: PSYCHIATRY & NEUROLOGY

## 2022-08-15 PROCEDURE — 63600175 PHARM REV CODE 636 W HCPCS: Performed by: FAMILY MEDICINE

## 2022-08-15 PROCEDURE — 80307 DRUG TEST PRSMV CHEM ANLYZR: CPT | Performed by: EMERGENCY MEDICINE

## 2022-08-15 PROCEDURE — 94761 N-INVAS EAR/PLS OXIMETRY MLT: CPT

## 2022-08-15 PROCEDURE — 11000001 HC ACUTE MED/SURG PRIVATE ROOM

## 2022-08-15 PROCEDURE — 99406 BEHAV CHNG SMOKING 3-10 MIN: CPT | Mod: S$GLB,,,

## 2022-08-15 PROCEDURE — 83735 ASSAY OF MAGNESIUM: CPT | Performed by: NURSE PRACTITIONER

## 2022-08-15 PROCEDURE — 99406 PT REFUSED TOBACCO CESSATION: ICD-10-PCS | Mod: S$GLB,,,

## 2022-08-15 PROCEDURE — 63600175 PHARM REV CODE 636 W HCPCS: Performed by: NURSE PRACTITIONER

## 2022-08-15 RX ORDER — LEVETIRACETAM 5 MG/ML
500 INJECTION INTRAVASCULAR EVERY 12 HOURS
Status: DISCONTINUED | OUTPATIENT
Start: 2022-08-15 | End: 2022-08-15

## 2022-08-15 RX ORDER — LEVOTHYROXINE SODIUM 112 UG/1
112 TABLET ORAL
Status: DISCONTINUED | OUTPATIENT
Start: 2022-08-15 | End: 2022-08-16 | Stop reason: HOSPADM

## 2022-08-15 RX ORDER — POTASSIUM CHLORIDE 20 MEQ/1
40 TABLET, EXTENDED RELEASE ORAL ONCE
Status: COMPLETED | OUTPATIENT
Start: 2022-08-15 | End: 2022-08-15

## 2022-08-15 RX ADMIN — Medication 10 ML: at 09:08

## 2022-08-15 RX ADMIN — SODIUM CHLORIDE: 0.9 INJECTION, SOLUTION INTRAVENOUS at 12:08

## 2022-08-15 RX ADMIN — CEFTRIAXONE 1 G: 1 INJECTION, SOLUTION INTRAVENOUS at 08:08

## 2022-08-15 RX ADMIN — POTASSIUM CHLORIDE 40 MEQ: 20 TABLET, EXTENDED RELEASE ORAL at 08:08

## 2022-08-15 RX ADMIN — LEVETIRACETAM 500 MG: 5 INJECTION INTRAVENOUS at 09:08

## 2022-08-15 RX ADMIN — LEVETIRACETAM 750 MG: 100 INJECTION, SOLUTION INTRAVENOUS at 09:08

## 2022-08-15 RX ADMIN — LEVOTHYROXINE SODIUM 112 MCG: 112 TABLET ORAL at 08:08

## 2022-08-15 NOTE — ASSESSMENT & PLAN NOTE
-Patient reports she takes lisinopril but does not know the dosage  -Per chart review from last month at  patient is on propranolol 40 mg BID--hold until can confirm   -Monitor and trend vital signs q4hr  -Will utilize p.r.n. blood pressure medication only if patient's blood pressure greater than  180/110 and she develops symptoms such as worsening chest pain or shortness of breath.

## 2022-08-15 NOTE — PROCEDURES
Routine EEG Report    Sabina Whitten  [id number]  1985    DATE OF SERVICE: 8/15/2022  REASON FOR CONSULT:  37-year-old woman with poorly characterized paroxysmal episodes admitted with confusion and decreased responsiveness.  Evaluate for evidence of epileptiform activity.    METHODOLOGY   Electroencephalographic (EEG) recording is with electrodes placed according to the International 10-20 placement system.  Thirty two (32) channels of digital signal (sampling rate of 512/sec) including T1 and T2 was simultaneously recorded from the scalp and may include  EKG, EMG, and/or eye monitors.  Recording band pass was 0.1 to 512 hz.  Digital video recording of the patient is simultaneously recorded with the EEG.  The patient is instructed report clinical symptoms which may occur during the recording session.  EEG and video recording is stored and archived in digital format. Activation procedures which include photic stimulation, hyperventilation and instructing patients to perform simple task are done in selected patients.    The EEG is displayed on a monitor screen and can be reviewed using different montages.  Computer assisted analysis is employed to detect spike and electrographic seizure activity.   The entire record is submitted for computer analysis.  The entire recording is visually reviewed and the times identified by computer analysis as being spikes or seizures are reviewed again.  Compresses spectral analysis (CSA) is also performed on the activity recorded from each individual channel.  This is displayed as a power display of frequencies from 0 to 30 Hz over time.   The CSA is reviewed looking for asymmetries in power between homologous areas of the scalp and then compared with the original EEG recording.     Deep Glint software is also utilized in the review of this study.  This software suite analyzes the EEG recording in multiple domains.  Coherence and rhythmicity is computed to identify EEG sections which  may contain organized seizures.  Each channel undergoes analysis to detect presence of spike and sharp waves which have special and morphological characteristic of epileptic activity.  The routine EEG recording is converted from spacial into frequency domain.  This is then displayed comparing homologous areas to identify areas of significant asymmetry.  Algorithm to identify non-cortically generated artifact is used to separate eye movement, EMG and other artifact from the EEG.      EEG FINDINGS  Background activity:   The waking background is somewhat disorganized mixed frequency theta/alpha activity with frequent very brief bursts of polymorphic theta/delta slowing.  There is a moderately well-formed somewhat poorly sustained 7-8 hz maximal posterior dominant rhythm.  There is plenty of admixed higher frequencies seen diffusely.    Sleep:  There is evidence of state transitions with the appearance of sleep architecture.    Activation procedures:   The patient is able to follow simple commands and answers some orientation questions correctly.  She correctly response her birthday, and [facility] but responds that it is  September 1987.     Cardiac Monitor:   Heart rate appears generally regular on a single lead EKG.    Impression:   This is an abnormal awake and asleep routine EEG because of a slow and disorganized background consistent with diffuse cortical dysfunction and a mild-moderate encephalopathy.  This finding is nonspecific with regards to etiology but can be seen in the setting of toxic/metabolic derangements, infection, and as a medication effect.  Higher frequency activity is often seen as a medication effect.  There are no prominent focal findings however encephalopathy obscures focal findings.  There are no epileptiform discharges and no electrographic seizures.    Cherry Boggs MD PhD  Neurology-Epilepsy  [facility].

## 2022-08-15 NOTE — ASSESSMENT & PLAN NOTE
-Assistance with smoking cessation was offered, including:  [x]  Medications  [x]  Counseling  []  Printed Information on Smoking Cessation  []  Referral to a Smoking Cessation Program  -Patient was counseled regarding smoking for 3-10 minutes.

## 2022-08-15 NOTE — ED NOTES
Care assumed from MAY Liu, here for seizure activities    Airway patent,  Breathing spontaneously, maintaining oxygen saturations above 95% on room air  Monitored in sinus rhythm, normotensive  Patient given medication prior to CT for seizure activity, resting with eyes closed.  Afebrile, nil obvious signs of pain ro discomfort.    RS MAY

## 2022-08-15 NOTE — ED PROVIDER NOTES
Encounter Date: 8/14/2022       History     Chief Complaint   Patient presents with    Seizures     Seziures daily per patient report, no history of seziures. Complaints of neck pain only at present time. Patient awake alert and confused to time, oriented to self, place and situation. No HA and no changes in her vision and denies changes in speech or weakness     Patient is a 37-year-old female with history of hypertension, anxiety who presents to the ED with complaints of seizure.  According to patient's partner, patient has had several seizures daily for the past 2 weeks.  No history of similar symptoms in the past.  Upon arrival, patient was in the waiting room it was noted to stiffen up and have seizure-like activity.  Patient denies drug or alcohol use, chest pain, shortness of breath.NO worsen or relieving factors    The history is provided by the patient.     Review of patient's allergies indicates:   Allergen Reactions    Morphine Other (See Comments)     Dizzy and sick to stomach    Tramadol Rash     Past Medical History:   Diagnosis Date    Anxiety     Fractured coccyx     Hypertension      Past Surgical History:   Procedure Laterality Date    none       No family history on file.  Social History     Tobacco Use    Smoking status: Current Some Day Smoker     Packs/day: 0.50     Years: 25.00     Pack years: 12.50     Types: Cigars, Vaping with nicotine, Cigarettes     Start date: 1997    Smokeless tobacco: Never Used    Tobacco comment: Pt switched from cigarettes/cigars to vaping with nicotine approx. 8 months ago. She declines referral to Ambulatory Smoking Cessation clinic at this time. Handout provided.   Substance Use Topics    Alcohol use: No     Comment: social    Drug use: No     Review of Systems   Unable to perform ROS: Mental status change   Neurological: Positive for seizures.   All other systems reviewed and are negative.      Physical Exam     Initial Vitals [08/14/22 1703]   BP  Pulse Resp Temp SpO2   (!) 176/111 106 20 98 °F (36.7 °C) 100 %      MAP       --         Physical Exam    Nursing note and vitals reviewed.  Constitutional: She appears well-developed and well-nourished. She appears distressed.   HENT:   Head: Normocephalic.   Nose: Nose normal.   Mouth/Throat: No oropharyngeal exudate.   Eyes: EOM are normal. Pupils are equal, round, and reactive to light.   Neck: Neck supple.   Cardiovascular: Normal rate, regular rhythm and normal heart sounds.   Pulmonary/Chest: Breath sounds normal. No respiratory distress. She has no wheezes. She has no rhonchi. She has no rales. She exhibits no tenderness.   Abdominal: Abdomen is soft. Bowel sounds are normal. There is no abdominal tenderness.   Musculoskeletal:         General: Normal range of motion.      Cervical back: Neck supple.     Neurological: She is alert and oriented to person, place, and time.   Skin: Skin is warm and dry. Capillary refill takes less than 2 seconds.   Psychiatric:   Bizarre behavior, dystonia, nonsense speech         ED Course   Procedures  Labs Reviewed   CBC W/ AUTO DIFFERENTIAL - Abnormal; Notable for the following components:       Result Value    WBC 14.45 (*)      (*)     MCH 36.5 (*)     MPV 8.8 (*)     Immature Granulocytes 0.6 (*)     Gran # (ANC) 12.2 (*)     Immature Grans (Abs) 0.08 (*)     Gran % 84.6 (*)     Lymph % 8.7 (*)     All other components within normal limits   COMPREHENSIVE METABOLIC PANEL - Abnormal; Notable for the following components:    Sodium 135 (*)     CO2 18 (*)     Total Bilirubin 1.3 (*)     All other components within normal limits   TSH - Abnormal; Notable for the following components:    TSH 26.787 (*)     All other components within normal limits   ACETAMINOPHEN LEVEL - Abnormal; Notable for the following components:    Acetaminophen (Tylenol), Serum <3.0 (*)     All other components within normal limits   T4, FREE - Abnormal; Notable for the following components:     Free T4 0.57 (*)     All other components within normal limits   ALCOHOL,MEDICAL (ETHANOL)   SARS-COV-2 RNA AMPLIFICATION, QUAL   TROPONIN I   MAGNESIUM     EKG Readings: (Independently Interpreted)   Initial Reading: No STEMI. Rhythm: Normal Sinus Rhythm. Heart Rate: 82. Conduction: Normal. Axis: Normal. Other Impression: Abnormal EKG, ST depression lead V6     ECG Results          EKG 12-lead (Final result)  Result time 08/15/22 19:58:23    Final result by Interface, Lab In [facility] (08/15/22 19:58:23)                 Narrative:    Test Reason : R41.82,    Vent. Rate : 101 BPM     Atrial Rate : 101 BPM     P-R Int : 144 ms          QRS Dur : 086 ms      QT Int : 352 ms       P-R-T Axes : 068 067 086 degrees     QTc Int : 456 ms    Sinus tachycardia  Otherwise normal ECG  When compared with ECG of 24-MAR-2020 15:34,  Nonspecific T wave abnormality now evident in Lateral leads  Confirmed by Brian Balderas MD ([id number]) on 8/15/2022 7:58:09 PM    Referred By: [email or web address]   SELF           Confirmed By:Brian Balderas MD                            Imaging Results          CT Maxillofacial Without Contrast (Final result)  Result time 08/14/22 19:26:59   Procedure changed from CT Maxillofacial With Contrast     Final result by Ford Brewer DO (08/14/22 19:26:59)                 Impression:      No acute maxillofacial fracture.      Electronically signed by: Ford Brewer  Date:    08/14/2022  Time:    19:26             Narrative:    EXAMINATION:  CT MAXILLOFACIAL WITHOUT CONTRAST    CLINICAL HISTORY:  AMS;    TECHNIQUE:  Low dose axial images, sagittal and coronal reformations were obtained through the face without intravenous contrast.  Examination was repeated due to motion artifact of the mandible.    COMPARISON:  CT head from 03/24/2020.    FINDINGS:  There is no acute fracture or dislocation of the maxillofacial structures. The nasal bones are intact. The nasal septum approximates midline.    The mandible is intact  and not dislocated. The temporomandibular joints are unremarkable. There are scattered dental caries and periapical lucencies, compatible with endodontal disease.    The orbits are unremarkable. The bilateral globes are symmetric and intact. There is no preseptal or post-septal fluid or hemorrhage.    The paranasal sinuses and mastoid air cells are clear.    The soft tissues of the face are unremarkable.    Partially imaged portions of the cervical spine are intact.                               X-Ray Chest AP Portable (Final result)  Result time 08/14/22 19:15:18    Final result by Ford Brewer DO (08/14/22 19:15:18)                 Impression:      No acute abnormality.      Electronically signed by: Ford Brewer  Date:    08/14/2022  Time:    19:15             Narrative:    EXAMINATION:  XR CHEST AP PORTABLE    CLINICAL HISTORY:  Altered mental status, unspecified    TECHNIQUE:  Single frontal view of the chest was performed.    COMPARISON:  03/24/2020.    FINDINGS:  The lungs are hypoexpanded and clear. No focal opacities are seen. The pleural spaces are clear.    The cardiac silhouette is unremarkable.    The visualized osseous structures are unremarkable.                               CT Head Without Contrast (Final result)  Result time 08/14/22 19:11:35    Final result by Ford Brewer DO (08/14/22 19:11:35)                 Impression:      No acute intracranial abnormality.      Electronically signed by: Ford Brewer  Date:    08/14/2022  Time:    19:11             Narrative:    EXAMINATION:  CT HEAD WITHOUT CONTRAST    CLINICAL HISTORY:  AMS;    TECHNIQUE:  Low dose axial CT images obtained throughout the head without intravenous contrast. Sagittal and coronal reconstructions were performed.    COMPARISON:  CT head 03/24/2020.    FINDINGS:  Ventricles and sulci are normal in size for age without evidence of hydrocephalus. No extra-axial blood or fluid collections.  The brain parenchyma is normal.  No parenchymal mass, hemorrhage, edema or major vascular distribution infarct.    No calvarial fracture.  The scalp is unremarkable.  Bilateral paranasal sinuses and mastoid air cells are clear.                                 Medications   0.9%  NaCl infusion ( Intravenous New Bag 8/15/22 0038)   diphenhydrAMINE injection 50 mg (50 mg Intramuscular Given 8/14/22 1813)   lorazepam injection 2 mg (2 mg Intramuscular Given 8/14/22 1812)   haloperidol lactate injection 5 mg (5 mg Intramuscular Given 8/14/22 1811)   levETIRAcetam injection 1,000 mg (1,000 mg Intravenous Given 8/14/22 1941)   potassium chloride SA CR tablet 40 mEq (40 mEq Oral Given 8/15/22 0811)     Medical Decision Making:   Initial Assessment:   Pt loaded with keprra, after given benadryl,  ativan, and haldol for agression , and bizaire behavior. We need to obtain a CT headto rule out CVA.   Independently Interpreted Test(s):   I have ordered and independently interpreted X-rays - see prior notes.  Clinical Tests:   Lab Tests: Ordered and Reviewed  Radiological Study: Ordered and Reviewed  ED Management:  Pt found to have hypothyroidism.. She is aback to baseline with her mental status. Discussed POC with pt, her signifianct other. She was subsequently admitted to  hospitalist.    All questions answered.  Other:   I have discussed this case with another health care provider.             ED Course as of 08/19/22 1017   Sun Aug 14, 2022   2301 Spoke with Dr Bethea who  [MC]      ED Course User Index  [MC] Nilda Austin MD             Clinical Impression:   Final diagnoses:  [R41.82] AMS (altered mental status)  [R41.82] AMS (altered mental status) - AMS  [E03.9] Hypothyroidism, unspecified type (Primary)  [R56.9] Seizures          ED Disposition Condition    Admit               Nilda Austin MD  08/19/22 1017

## 2022-08-15 NOTE — PROGRESS NOTES
Smoking cessation education note: Pt is arousable but very groggy; unable to stay awake for session.  Will follow up for smoking cessation education at a later time.

## 2022-08-15 NOTE — PROGRESS NOTES
08/15/22 0131   Admission   Initial VN Admission Questions Complete   Communication Issues? None   Shift   Virtual Nurse - Rounding Complete   Virtual Nurse - Patient Verbalized Approval Of Camera Use   Safety/Activity   Patient Rounds bed in low position;placement of personal items at bedside;call light in patient/parent reach;visualized patient   Safety Promotion/Fall Prevention Fall Risk reviewed with patient/family;side rails raised x 2;medications reviewed;assistive device/personal item within reach;instructed to call staff for mobility   Positioning   Body Position right

## 2022-08-15 NOTE — CONSULTS
"NEUROLOGY FLOOR CONSULT    Reason for consult:  Seizures    CC:  "I've been having seizures"    HPI (per patient and chart review):   Sabina Whitten is a 37 y.o. w/ Hx of  polysubstance abuse, hypertension, and anxiety who presented to the ED for evaluation for seizures. Per ED notes patient had been acting abnormal and having seizures for past few days as reported by her fiance before he was escorted out of the hospital. Patient is disoriented and a poor historian. She denies history of seizures though chart review noted it in 2021. She says she was watching TV then fell and denies use of drugs though current and previous UDS reports positive for benzo (which she reports taking xanax), THC (she currently denies) and amphetamin. Patient oriented to self and place in the ED, disoriented to situation and states it is August of 1999. She was lethargic, however, responded easily to verbal stimulation. She reported taking lisinopril for her hypertension though not aware of dosage and chart review mentions she takes propranolol.      In the ED: labs remarkable for TSH 26.787, free T4 0.27, Na 135, WBC 14.45, T bili 1.3. Witnessed seizure in waiting area. Patient given benadryl, haldol, and ativan x1. Given Keppra IV load (1000mg). Patient was also noted to be hypertensive initially with  then normotensive. CT maxillofacial: No acute maxillofacial fracture. CXR: no acute abnormality. CTH: no acute intracranial abnormality. Admitted to [facility] for further care and neurology work up. Patient started on Keppra 500mg BID by medicine team.     Today, patient found resting after EEG. Difficult to obtain additional history as patient sleeping and intermittently ignoring examiner. Patient eventually reported that she has had seizures for the last several days. She is unsure what her seizures look like and is unable to remember events surrounding the seizures. She states that she has no prior history of " seizures or family history. Denies recent illness or sick contacts. Denies substance abuse. Denies increased stressors at home.    ROS: As per HPI    Histories:     Allergies:  Morphine and Tramadol    Current Medications:    Current Facility-Administered Medications   Medication Dose Route Frequency Provider Last Rate Last Admin    0.9%  NaCl infusion   Intravenous Continuous Meryl Melchor  mL/hr at 08/15/22 0038 New Bag at 08/15/22 0038    acetaminophen tablet 650 mg  650 mg Oral Q8H PRN Meryl Melchor NP        albuterol-ipratropium 2.5 mg-0.5 mg/3 mL nebulizer solution 3 mL  3 mL Nebulization Q4H PRN Meryl Melchor NP        cefTRIAXone (ROCEPHIN) 1 g/50 mL D5W IVPB  1 g Intravenous Q24H Meryl Melchor NP   Stopped at 08/15/22 0846    dextrose 10% bolus 125 mL  12.5 g Intravenous PRN Meryl Melchor NP        dextrose 10% bolus 250 mL  25 g Intravenous PRN Meryl Melchor NP        glucagon (human recombinant) injection 1 mg  1 mg Intramuscular PRN Meryl Melchor NP        glucose chewable tablet 16 g  16 g Oral PRN Meryl Melchor NP        glucose chewable tablet 24 g  24 g Oral PRN Meryl Melchor NP        levETIRAcetam in NaCl (iso-os) IVPB 500 mg  500 mg Intravenous Q12H Ericka Melton  mL/hr at 08/15/22 0911 500 mg at 08/15/22 0911    levothyroxine tablet 112 mcg  112 mcg Oral Before breakfast Meryl Melchor NP   112 mcg at 08/15/22 0811    lorazepam injection 2 mg  2 mg Intravenous Q2H PRN Meryl Melchor NP        melatonin tablet 6 mg  6 mg Oral Nightly PRN Meryl Melchor NP        naloxone 0.4 mg/mL injection 0.02 mg  0.02 mg Intravenous PRN Meryl Melchor NP        ondansetron injection 4 mg  4 mg Intravenous Q8H PRN Meryl Melchor NP        polyethylene glycol packet 17 g  17 g Oral Daily PRN Meryl Melchor NP        prochlorperazine injection Soln 5 mg  5 mg Intravenous Q6H  PRN Meryl Melchor NP        sodium chloride 0.9% flush 10 mL  10 mL Intravenous Q8H Meryl Melchor NP           Past Medical/Surgical/Family History:  Medical:   Past Medical History:   Diagnosis Date    Anxiety     Fractured coccyx     Hypertension       Surgeries:   Past Surgical History:   Procedure Laterality Date    none        Family: No family history on file.    Social History:  Social History     Tobacco Use    Smoking status: Current Some Day Smoker     Types: Cigars    Smokeless tobacco: Never Used    Tobacco comment: no longer smoking cigars- 11/22/2021   Substance Use Topics    Alcohol use: No     Comment: social    Drug use: Denies, UDS with THC and amphetamine     Current Evaluation:     Vital Signs:   Vitals:    08/15/22 0720   BP: 115/62   Pulse: 89   Resp: 18   Temp: 97.5 °F (36.4 °C)      Neurological Examination  Orientation  Alert, awake, oriented to self, place, time, and situation.  Memory  Recent and remote memory intact.  Language  No dysarthria, No aphasia.   Cranial Nerves  PERRL, VF intact, EOMI, V1-V3 intact, symmetric facial expression, hearing grossly intact, SCM & TPZ 5/5, tongue midline, symmetric palate elevation.  Several lacerations found on tongue  Bruise to lateral right eye, reports she fell during a seizure  Motor  Normal Bulk  Normal Tone  5/5 strength in 4 extremities  Sensory  Normal to light touch throughout  Romberg Negative  DTR   +2 symmetric  Cerebellar/Gait  Deferred due to patient preference    LABORATORY STUDIES:  Thyroid 26.787  T4:  0.57  HgA1C%:  Pending, last 4.8 in 11/2021  Vit B12: N/A, last 238 in 11/2021  Folate:  N/A, last 3.6 in 11/2021    RADIOLOGY STUDIES:  I have personally reviewed the images performed.     HEAD CT:  FINDINGS:  Ventricles and sulci are normal in size for age without evidence of hydrocephalus. No extra-axial blood or fluid collections.  The brain parenchyma is normal. No parenchymal mass, hemorrhage, edema or  major vascular distribution infarct.  No calvarial fracture.  The scalp is unremarkable.  Bilateral paranasal sinuses and mastoid air cells are clear.  Impression:  No acute intracranial abnormality.    Assessment:  Plan:     Sabina Whitten is a 37 y.o. w/ Hx of polysubstance abuse, hypertension, and anxiety who presented to the ED for evaluation for seizures. Patient's fiance reported multiple suspected seizures over the last several days. Patient with one witnessed event in the ED. Labs concerning for hypothyroidism, THC/Amphetamines on drug screen, and U/A with possible UTI. Suspect provoked seizure in setting of above mentioned abnormalities.    - Agree with Keppra, but would increase to 750mg BID  - Please obtain MRI Brain Epilepsy Protocol  - UTI management per primary team, avoiding antibiotics that can reduce seizure threshold  - Agree with spot EEG -- pending read    Case discussed with Dr. Krause    Will continue to follow.    Louis Sousa MD  [employer] [facility] PGY-IV  [facility] [facility] Consult Service

## 2022-08-15 NOTE — ED NOTES
Pt transported to CT on stretcher. While in CT, pt redirectable and able to state her name, birthday, and location she is at. Pt resting comfortbaly

## 2022-08-15 NOTE — HPI
Sabina Whitten is a 37-year-old female with a history of polysubstance abuse, hypertension, and anxiety who presented to the ED for evaluation for seizures. Per ED notes patient had been acting abnormal and having seizures for past few days as reported by her fiance before he was escorted out of the hospital. Patient is disoriented and a poor historian. She denies history of seizures though chart review noted it in 2021. She says she was watching TV then fell and denies use of drugs thought UDS reports positive for benzo (which she reports taking xanax) and THC (she currently denies). Patient is currently oriented to self and place, disoriented to situation and states it is August of 1999. She is lethargic however responds easily to verbal stimulation. She reports taking lisinopril for her hypertension though not aware of dosage and chart review mentions she takes propranolol.     In the ED: labs remarkable for TSH 26.787, free T4 0.27, Na 135, WBC 14.45, T bili 1.3. Witnessed seizure in waiting area. Patient given benadryl, haldol, and ativan x1. Given Keppra IV load. Patient was also noted to be hypertensive initially with  then normotensive. CT maxillofacial: No acute maxillofacial fracture. CXR: no acute abnormality. CTH: no acute intracranial abnormality. Admitted to [facility] for further care and neurology work up.

## 2022-08-15 NOTE — ASSESSMENT & PLAN NOTE
Severe benzodiazepine use disorder  Opioid use disorder, severe, dependence  -UDS positive for THC and amphetamines  -Patient states she takes xanax

## 2022-08-15 NOTE — PLAN OF CARE
Sw met with pt to discuss d/c planning. Pt lives with pt's mom Roxana [phone] and pt's uncle. Pt has no DME and no HH. Roxana drives pt to doctor appointments. Pt stated she is unsure if she will need a ride home or not at the time of d/c. Sw encouraged pt to call with any questions or concerns. Sw will continue to follow pt for d/c planning.        08/15/22 7632   Discharge Assessment   Assessment Type Discharge Planning Assessment   Confirmed/corrected address, phone number and insurance Yes   Confirmed Demographics Correct on Facesheet   Source of Information patient   Lives With parent(s);other relative(s)   Facility Arrived From: home   Do you expect to return to your current living situation? Yes   Do you have help at home or someone to help you manage your care at home? Yes   Who are your caregiver(s) and their phone number(s)? pt's mom Roxana [phone]   Prior to hospitilization cognitive status: Alert/Oriented   Current cognitive status: Alert/Oriented   Walking or Climbing Stairs Difficulty none   Dressing/Bathing Difficulty none   Equipment Currently Used at Home none   Readmission within 30 days? No   Patient currently being followed by outpatient case management? No   Do you currently have service(s) that help you manage your care at home? No   Do you have prescription coverage? Yes   Coverage medicaid   Do you have any problems affording any of your prescribed medications? No   Who is going to help you get home at discharge? pt's latia Schmidt [phone]   How do you get to doctors appointments? family or friend will provide   Are you on dialysis? No   Do you take coumadin? No   Discharge Plan A Home with family   Discharge Plan B Home Health   DME Needed Upon Discharge    (TBD)   Discharge Plan discussed with: Patient   Discharge Barriers Identified None   Relationship/Environment   Name(s) of Who Lives With Patient pt's mom Roxana [phone] and pt's uncle

## 2022-08-15 NOTE — CARE UPDATE
37F with PMH of polysubstance abuse, hypertension, and anxiety admitted for suspected seizures. Patient reports multiple seizures over the past week. One witnessed seizure in ED. Pt states she was on AED several years ago due to possible pre-eclampsia, but AED was discontinued after delivery; no further seizures since. Pt also noted to have hypothyroidism and UTI. UDS + for THC/amphetamines. Started on keppra. Pt back to baseline. EEG showed no seizure activity. Neurology consulted.    Plan:   MRI brain pending. Cont keppra. Cont ceftriaxone for UTI.

## 2022-08-15 NOTE — ED NOTES
Patient ambulant to bathroom without notifying nurse, unable to catch specimen, has been instructed next time needs to go we need a urine sample.    RS RN

## 2022-08-15 NOTE — ASSESSMENT & PLAN NOTE
-Per chart review patient takes paroxetine 20 mg daily and Topamax 200 mg daily  -Patient denies use of medications  -Denies current depression or suicidal ideation  -No need for psych evaluation at this time

## 2022-08-15 NOTE — ASSESSMENT & PLAN NOTE
-Patient denies history of hypothyroidism   TFTs reviewed-   Lab Results   Component Value Date    TSH 26.787 (H) 08/14/2022   -Free T4: 0.57  -Will start levothyroxine at 112 mcg and adjust for and clinical changes.

## 2022-08-15 NOTE — SUBJECTIVE & OBJECTIVE
Past Medical History:   Diagnosis Date    Anxiety     Fractured coccyx     Hypertension        Past Surgical History:   Procedure Laterality Date    none         Review of patient's allergies indicates:   Allergen Reactions    Morphine Other (See Comments)     Dizzy and sick to stomach    Tramadol Rash       No current facility-administered medications on file prior to encounter.     Current Outpatient Medications on File Prior to Encounter   Medication Sig    ALPRAZolam (XANAX) 1 MG tablet Take 1 mg by mouth 3 (three) times daily.    zolpidem (AMBIEN) 10 mg Tab Take 10 mg by mouth nightly.    amoxicillin-clavulanate 875-125mg (AUGMENTIN) 875-125 mg per tablet Take 1 tablet by mouth 2 (two) times daily.     Family History    None       Tobacco Use    Smoking status: Current Some Day Smoker     Types: Cigars    Smokeless tobacco: Never Used    Tobacco comment: no longer smoking cigars- 11/22/2021   Substance and Sexual Activity    Alcohol use: No     Comment: social    Drug use: No    Sexual activity: Not Currently     Review of Systems   Unable to perform ROS: Acuity of condition   Objective:     Vital Signs (Most Recent):  Temp: 98.8 °F (37.1 °C) (08/15/22 0036)  Pulse: 95 (08/15/22 0036)  Resp: 17 (08/15/22 0036)  BP: (!) 149/91 (08/15/22 0036)  SpO2: 98 % (08/14/22 2345)   Vital Signs (24h Range):  Temp:  [98 °F (36.7 °C)-98.8 °F (37.1 °C)] 98.8 °F (37.1 °C)  Pulse:  [] 95  Resp:  [14-38] 17  SpO2:  [94 %-100 %] 98 %  BP: (116-176)/() 149/91     Weight: 66.2 kg (146 lb)  Body mass index is 25.86 kg/m².    Physical Exam  Vitals and nursing note reviewed.   Constitutional:       General: She is sleeping.      Appearance: She is not ill-appearing or diaphoretic.   HENT:      Head: Normocephalic and atraumatic.      Mouth/Throat:      Mouth: Mucous membranes are dry.   Eyes:      Pupils: Pupils are equal, round, and reactive to light.   Cardiovascular:      Rate and Rhythm: Normal rate and regular rhythm.       Pulses: Normal pulses.      Heart sounds: Normal heart sounds. No murmur heard.  Pulmonary:      Effort: Pulmonary effort is normal. No respiratory distress.   Abdominal:      General: Bowel sounds are normal. There is no distension.      Palpations: Abdomen is soft.      Tenderness: There is no abdominal tenderness.   Musculoskeletal:         General: No swelling.      Cervical back: Normal range of motion.   Skin:     General: Skin is warm and dry.      Capillary Refill: Capillary refill takes 2 to 3 seconds.   Neurological:      Mental Status: She is easily aroused. She is disoriented.   Psychiatric:         Behavior: Behavior normal.         CRANIAL NERVES     CN III, IV, VI   Pupils are equal, round, and reactive to light.     Significant Labs: All pertinent labs within the past 24 hours have been reviewed.    Significant Imaging: I have reviewed all pertinent imaging results/findings within the past 24 hours.

## 2022-08-15 NOTE — ASSESSMENT & PLAN NOTE
-Reported > 10 seizures over a few weeks. Patient denies prior history however chart review noted in 2021 history of seizure  -Last seizure witnessed here in the ED waiting room   -Unsure if it was provoked or not  -Likely due to an underlying seizure disorder vs hypothyroidism vs polysubstance abuse  -Given benadryl, haldol and started keppra in ED  -No CT evidence of acute intracranial abnormality.  -CXR: no acute abnormality.  -UDS positive for amphetamines and THC  -WBC 14 likely reactive  -On exam patient is asleep arouses to voice, she is oriented to self, place, however says it is August 1999. She is oriented to the facility however does not know how she got here. She reports she was watching TV then fell.  -Seizure precautions  -Neuro checks Q4  -PRN ativan  -Resume Keppra IV at 500 mg BID  -EEG in am  -Consult neurology

## 2022-08-15 NOTE — H&P
[facility]  History & Physical    Patient Name: Sabina Whitten  MRN: [id number]  Patient Class: IP- Inpatient  Admission Date: 8/14/2022  Attending Physician: Ericka Melton*   Primary Care Provider: Primary Doctor No         Patient information was obtained from patient, past medical records and ER records.     Subjective:     Principal Problem:Seizure    Chief Complaint:   Chief Complaint   Patient presents with    Seizures     Seziures daily per patient report, no history of seziures. Complaints of neck pain only at present time. Patient awake alert and confused to time, oriented to self, place and situation. No HA and no changes in her vision and denies changes in speech or weakness        HPI: Sabina Whitten is a 37-year-old female with a history of polysubstance abuse, hypertension, and anxiety who presented to the ED for evaluation for seizures. Per ED notes patient had been acting abnormal and having seizures for past few days as reported by her fiance before he was escorted out of the hospital. Patient is disoriented and a poor historian. She denies history of seizures though chart review noted it in 2021. She says she was watching TV then fell and denies use of drugs thought UDS reports positive for benzo (which she reports taking xanax) and THC (she currently denies). Patient is currently oriented to self and place, disoriented to situation and states it is August of 1999. She is lethargic however responds easily to verbal stimulation. She reports taking lisinopril for her hypertension though not aware of dosage and chart review mentions she takes propranolol.     In the ED: labs remarkable for TSH 26.787, free T4 0.27, Na 135, WBC 14.45, T bili 1.3. Witnessed seizure in waiting area. Patient given benadryl, haldol, and ativan x1. Given Keppra IV load. Patient was also noted to be hypertensive initially with  then normotensive. CT maxillofacial: No acute  maxillofacial fracture. CXR: no acute abnormality. CTH: no acute intracranial abnormality. Admitted to [facility] for further care and neurology work up.       Past Medical History:   Diagnosis Date    Anxiety     Fractured coccyx     Hypertension        Past Surgical History:   Procedure Laterality Date    none         Review of patient's allergies indicates:   Allergen Reactions    Morphine Other (See Comments)     Dizzy and sick to stomach    Tramadol Rash       No current facility-administered medications on file prior to encounter.     Current Outpatient Medications on File Prior to Encounter   Medication Sig    ALPRAZolam (XANAX) 1 MG tablet Take 1 mg by mouth 3 (three) times daily.    zolpidem (AMBIEN) 10 mg Tab Take 10 mg by mouth nightly.    amoxicillin-clavulanate 875-125mg (AUGMENTIN) 875-125 mg per tablet Take 1 tablet by mouth 2 (two) times daily.     Family History    None       Tobacco Use    Smoking status: Current Some Day Smoker     Types: Cigars    Smokeless tobacco: Never Used    Tobacco comment: no longer smoking cigars- 11/22/2021   Substance and Sexual Activity    Alcohol use: No     Comment: social    Drug use: No    Sexual activity: Not Currently     Review of Systems   Unable to perform ROS: Acuity of condition   Objective:     Vital Signs (Most Recent):  Temp: 98.8 °F (37.1 °C) (08/15/22 0036)  Pulse: 95 (08/15/22 0036)  Resp: 17 (08/15/22 0036)  BP: (!) 149/91 (08/15/22 0036)  SpO2: 98 % (08/14/22 2345)   Vital Signs (24h Range):  Temp:  [98 °F (36.7 °C)-98.8 °F (37.1 °C)] 98.8 °F (37.1 °C)  Pulse:  [] 95  Resp:  [14-38] 17  SpO2:  [94 %-100 %] 98 %  BP: (116-176)/() 149/91     Weight: 66.2 kg (146 lb)  Body mass index is 25.86 kg/m².    Physical Exam  Vitals and nursing note reviewed.   Constitutional:       General: She is sleeping.      Appearance: She is not ill-appearing or diaphoretic.   HENT:      Head: Normocephalic and atraumatic.       Mouth/Throat:      Mouth: Mucous membranes are dry.   Eyes:      Pupils: Pupils are equal, round, and reactive to light.   Cardiovascular:      Rate and Rhythm: Normal rate and regular rhythm.      Pulses: Normal pulses.      Heart sounds: Normal heart sounds. No murmur heard.  Pulmonary:      Effort: Pulmonary effort is normal. No respiratory distress.   Abdominal:      General: Bowel sounds are normal. There is no distension.      Palpations: Abdomen is soft.      Tenderness: There is no abdominal tenderness.   Musculoskeletal:         General: No swelling.      Cervical back: Normal range of motion.   Skin:     General: Skin is warm and dry.      Capillary Refill: Capillary refill takes 2 to 3 seconds.   Neurological:      Mental Status: She is easily aroused. She is disoriented.   Psychiatric:         Behavior: Behavior normal.         CRANIAL NERVES     CN III, IV, VI   Pupils are equal, round, and reactive to light.     Significant Labs: All pertinent labs within the past 24 hours have been reviewed.    Significant Imaging: I have reviewed all pertinent imaging results/findings within the past 24 hours.    Assessment/Plan:     * New onset seizure  -Reported > 10 seizures over a few weeks. Patient denies prior history however chart review noted in 2021 history of seizure  -Last seizure witnessed here in the ED waiting room   -Unsure if it was provoked or not  -Likely due to an underlying seizure disorder vs hypothyroidism vs polysubstance abuse  -Given benadryl, haldol and started keppra in ED  -No CT evidence of acute intracranial abnormality.  -CXR: no acute abnormality.  -UDS positive for amphetamines and THC  -WBC 14 likely reactive  -On exam patient is asleep arouses to voice, she is oriented to self, place, however says it is August 1999. She is oriented to the facility however does not know how she got here. She reports she was watching TV then fell.  -Seizure precautions  -Neuro checks Q4  -PRN  ativan  -Resume Keppra IV at 500 mg BID  -EEG in am  -Consult neurology    Urinary tract infection without hematuria  -UA positive  -Rocephin      Hypothyroidism  -Patient denies history of hypothyroidism   TFTs reviewed-   Lab Results   Component Value Date    TSH 26.787 (H) 08/14/2022   -Free T4: 0.57  -Will start levothyroxine at 112 mcg and adjust for and clinical changes.    Cannabis use disorder, severe, dependence  Severe benzodiazepine use disorder  Opioid use disorder, severe, dependence  -UDS positive for THC and amphetamines  -Patient states she takes xanax      Major depressive disorder, recurrent, severe with psychotic features  -Per chart review patient takes paroxetine 20 mg daily and Topamax 200 mg daily  -Patient denies use of medications  -Denies current depression or suicidal ideation  -No need for psych evaluation at this time    Nicotine use disorder  -Assistance with smoking cessation was offered, including:  [x]  Medications  [x]  Counseling  []  Printed Information on Smoking Cessation  []  Referral to a Smoking Cessation Program  -Patient was counseled regarding smoking for 3-10 minutes.    HTN (hypertension)  -Patient reports she takes lisinopril but does not know the dosage  -Per chart review from last month at  patient is on propranolol 40 mg BID--hold until can confirm   -Monitor and trend vital signs q4hr  -Will utilize p.r.n. blood pressure medication only if patient's blood pressure greater than  180/110 and she develops symptoms such as worsening chest pain or shortness of breath.      VTE Risk Mitigation (From admission, onward)         Ordered     IP VTE LOW RISK PATIENT  Once         08/14/22 2323     Place sequential compression device  Until discontinued         08/14/22 2323                   Meryl Melchor DNP, MAZIN[facility]-BC  Hospitalist   Department of Hospital Medicine   [facility]   Office #: [phone]

## 2022-08-15 NOTE — ED NOTES
"Assumed care of pt, she returned from CT, appears calm and following commands, she is awake and alert x4. Pts fiance at bedside visibly aggravated states "yall are making her worse, nobody has seen her, she fell and yall arent doing anything". I asked him to calm down, I explained situation and that we are doing everything we can to help her, pt is getting more restless, security at bedside and escorted pts fiance out of room.   "

## 2022-08-16 ENCOUNTER — CLINICAL SUPPORT (OUTPATIENT)
Dept: SMOKING CESSATION | Facility: CLINIC | Age: 37
End: 2022-08-16

## 2022-08-16 VITALS
HEIGHT: 63 IN | OXYGEN SATURATION: 99 % | SYSTOLIC BLOOD PRESSURE: 158 MMHG | WEIGHT: 133.38 LBS | HEART RATE: 86 BPM | RESPIRATION RATE: 18 BRPM | BODY MASS INDEX: 23.63 KG/M2 | TEMPERATURE: 97 F | DIASTOLIC BLOOD PRESSURE: 96 MMHG

## 2022-08-16 DIAGNOSIS — F17.210 CIGARETTE SMOKER: Primary | ICD-10-CM

## 2022-08-16 LAB
ALBUMIN SERPL BCP-MCNC: 3.7 G/DL (ref 3.5–5.2)
ALP SERPL-CCNC: 53 U/L (ref 55–135)
ALT SERPL W/O P-5'-P-CCNC: 12 U/L (ref 10–44)
ANION GAP SERPL CALC-SCNC: 10 MMOL/L (ref 8–16)
AST SERPL-CCNC: 18 U/L (ref 10–40)
BASOPHILS # BLD AUTO: 0.07 K/UL (ref 0–0.2)
BASOPHILS NFR BLD: 0.8 % (ref 0–1.9)
BILIRUB SERPL-MCNC: 0.9 MG/DL (ref 0.1–1)
BUN SERPL-MCNC: 8 MG/DL (ref 6–20)
CALCIUM SERPL-MCNC: 8.8 MG/DL (ref 8.7–10.5)
CHLORIDE SERPL-SCNC: 106 MMOL/L (ref 95–110)
CO2 SERPL-SCNC: 21 MMOL/L (ref 23–29)
CREAT SERPL-MCNC: 0.8 MG/DL (ref 0.5–1.4)
DIFFERENTIAL METHOD: ABNORMAL
EOSINOPHIL # BLD AUTO: 0.2 K/UL (ref 0–0.5)
EOSINOPHIL NFR BLD: 2.1 % (ref 0–8)
ERYTHROCYTE [DISTWIDTH] IN BLOOD BY AUTOMATED COUNT: 13.9 % (ref 11.5–14.5)
EST. GFR  (NO RACE VARIABLE): >60 ML/MIN/1.73 M^2
GLUCOSE SERPL-MCNC: 86 MG/DL (ref 70–110)
HCT VFR BLD AUTO: 39.9 % (ref 37–48.5)
HGB BLD-MCNC: 13.7 G/DL (ref 12–16)
IMM GRANULOCYTES # BLD AUTO: 0.03 K/UL (ref 0–0.04)
IMM GRANULOCYTES NFR BLD AUTO: 0.4 % (ref 0–0.5)
LYMPHOCYTES # BLD AUTO: 3.2 K/UL (ref 1–4.8)
LYMPHOCYTES NFR BLD: 37.8 % (ref 18–48)
MAGNESIUM SERPL-MCNC: 1.8 MG/DL (ref 1.6–2.6)
MCH RBC QN AUTO: 36.2 PG (ref 27–31)
MCHC RBC AUTO-ENTMCNC: 34.3 G/DL (ref 32–36)
MCV RBC AUTO: 106 FL (ref 82–98)
MONOCYTES # BLD AUTO: 0.7 K/UL (ref 0.3–1)
MONOCYTES NFR BLD: 8.7 % (ref 4–15)
NEUTROPHILS # BLD AUTO: 4.3 K/UL (ref 1.8–7.7)
NEUTROPHILS NFR BLD: 50.2 % (ref 38–73)
NRBC BLD-RTO: 0 /100 WBC
PLATELET # BLD AUTO: 365 K/UL (ref 150–450)
PMV BLD AUTO: 9 FL (ref 9.2–12.9)
POTASSIUM SERPL-SCNC: 3.4 MMOL/L (ref 3.5–5.1)
PROT SERPL-MCNC: 6.6 G/DL (ref 6–8.4)
RBC # BLD AUTO: 3.78 M/UL (ref 4–5.4)
SODIUM SERPL-SCNC: 137 MMOL/L (ref 136–145)
WBC # BLD AUTO: 8.51 K/UL (ref 3.9–12.7)

## 2022-08-16 PROCEDURE — 80053 COMPREHEN METABOLIC PANEL: CPT | Performed by: NURSE PRACTITIONER

## 2022-08-16 PROCEDURE — S4991 NICOTINE PATCH NONLEGEND: HCPCS | Performed by: HOSPITALIST

## 2022-08-16 PROCEDURE — 99406 BEHAV CHNG SMOKING 3-10 MIN: CPT | Mod: S$GLB,,,

## 2022-08-16 PROCEDURE — 25000003 PHARM REV CODE 250: Performed by: INTERNAL MEDICINE

## 2022-08-16 PROCEDURE — 99406 PT REFUSED TOBACCO CESSATION: ICD-10-PCS | Mod: S$GLB,,,

## 2022-08-16 PROCEDURE — 25000003 PHARM REV CODE 250: Performed by: NURSE PRACTITIONER

## 2022-08-16 PROCEDURE — 63600175 PHARM REV CODE 636 W HCPCS: Performed by: NURSE PRACTITIONER

## 2022-08-16 PROCEDURE — 85025 COMPLETE CBC W/AUTO DIFF WBC: CPT | Performed by: NURSE PRACTITIONER

## 2022-08-16 PROCEDURE — A4216 STERILE WATER/SALINE, 10 ML: HCPCS | Performed by: NURSE PRACTITIONER

## 2022-08-16 PROCEDURE — 36415 COLL VENOUS BLD VENIPUNCTURE: CPT | Performed by: NURSE PRACTITIONER

## 2022-08-16 PROCEDURE — 83735 ASSAY OF MAGNESIUM: CPT | Performed by: NURSE PRACTITIONER

## 2022-08-16 PROCEDURE — 25000003 PHARM REV CODE 250: Performed by: HOSPITALIST

## 2022-08-16 PROCEDURE — 63600175 PHARM REV CODE 636 W HCPCS: Performed by: INTERNAL MEDICINE

## 2022-08-16 RX ORDER — AMOXICILLIN AND CLAVULANATE POTASSIUM 875; 125 MG/1; MG/1
1 TABLET, FILM COATED ORAL 2 TIMES DAILY
Qty: 10 TABLET | Refills: 0 | Status: SHIPPED | OUTPATIENT
Start: 2022-08-16 | End: 2022-08-21

## 2022-08-16 RX ORDER — LEVOTHYROXINE SODIUM 112 UG/1
112 TABLET ORAL
Qty: 30 TABLET | Refills: 11 | Status: SHIPPED | OUTPATIENT
Start: 2022-08-17 | End: 2023-08-17

## 2022-08-16 RX ORDER — IBUPROFEN 200 MG
1 TABLET ORAL ONCE
Status: DISCONTINUED | OUTPATIENT
Start: 2022-08-16 | End: 2022-08-16 | Stop reason: HOSPADM

## 2022-08-16 RX ADMIN — Medication 10 ML: at 05:08

## 2022-08-16 RX ADMIN — LEVETIRACETAM 750 MG: 100 INJECTION, SOLUTION INTRAVENOUS at 09:08

## 2022-08-16 RX ADMIN — LEVOTHYROXINE SODIUM 112 MCG: 112 TABLET ORAL at 05:08

## 2022-08-16 RX ADMIN — NICOTINE 1 PATCH: 14 PATCH, EXTENDED RELEASE TRANSDERMAL at 12:08

## 2022-08-16 RX ADMIN — CEFTRIAXONE 1 G: 1 INJECTION, SOLUTION INTRAVENOUS at 08:08

## 2022-08-16 NOTE — DISCHARGE SUMMARY
[facility]  Discharge Summary      Patient Name: Sabina Whitten  MRN: [id number]  Patient Class: IP- Inpatient  Admission Date: 8/14/2022  Hospital Length of Stay: 2 days  Discharge Date and Time: 8/16/2022  2:57 PM  Attending Physician: No att. providers found   Discharging Provider: Vinnie Sandoval MD  Primary Care Provider: Primary Doctor Kori      HPI:   Sabina Whitten is a 37-year-old female with a history of polysubstance abuse, hypertension, and anxiety who presented to the ED for evaluation for seizures. Per ED notes patient had been acting abnormal and having seizures for past few days as reported by her fiance before he was escorted out of the hospital. Patient is disoriented and a poor historian. She denies history of seizures though chart review noted it in 2021. She says she was watching TV then fell and denies use of drugs thought UDS reports positive for benzo (which she reports taking xanax) and THC (she currently denies). Patient is currently oriented to self and place, disoriented to situation and states it is August of 1999. She is lethargic however responds easily to verbal stimulation. She reports taking lisinopril for her hypertension though not aware of dosage and chart review mentions she takes propranolol.     In the ED: labs remarkable for TSH 26.787, free T4 0.27, Na 135, WBC 14.45, T bili 1.3. Witnessed seizure in waiting area. Patient given benadryl, haldol, and ativan x1. Given Keppra IV load. Patient was also noted to be hypertensive initially with  then normotensive. CT maxillofacial: No acute maxillofacial fracture. CXR: no acute abnormality. CTH: no acute intracranial abnormality. Admitted to [facility] for further care and neurology work up.       * No surgery found *      Hospital Course:   Ms. Whitten presents with seizures. Labs concerning for hypothyroidism, THC/Amphetamines on drug screen, and U/A with UTI. EEG with slow and  "disorganized background consistent with diffuse cortical dysfunction and a mild-moderate encephalopathy, and no epileptiform discharges and no electrographic seizures. MRI without acute findings. She was monitored in house without any further seizure activity.Treated with antibiotics and started on keppra. Discussed seizure precautions with patient and family over the phone.    BP (!) 158/96 (Patient Position: Lying)   Pulse 86   Temp 97.1 °F (36.2 °C) (Oral)   Resp 18   Ht 5' 3" (1.6 m)   Wt 60.5 kg (133 lb 6.1 oz)   SpO2 99%   Breastfeeding No   BMI 23.63 kg/m²   Physical Exam  Vitals and nursing note reviewed.   Constitutional:       General: She is sleeping.      Appearance: She is not ill-appearing or diaphoretic.   HENT:      Head: Normocephalic and atraumatic.      Mouth/Throat:      Mouth: Mucous membranes are dry.   Eyes:      Pupils: Pupils are equal, round, and reactive to light.   Cardiovascular:      Rate and Rhythm: Normal rate and regular rhythm.      Pulses: Normal pulses.      Heart sounds: Normal heart sounds. No murmur heard.  Pulmonary:      Effort: Pulmonary effort is normal. No respiratory distress.   Abdominal:      General: Bowel sounds are normal. There is no distension.      Palpations: Abdomen is soft.      Tenderness: There is no abdominal tenderness.   Musculoskeletal:         General: No swelling.      Cervical back: Normal range of motion.   Skin:     General: Skin is warm and dry.      Capillary Refill: Capillary refill takes 2 to 3 seconds.   Neurological:      Mental Status: She is easily aroused. She is disoriented.   Psychiatric:         Behavior: Behavior normal.        Goals of Care Treatment Preferences:  Code Status: Full Code      Consults:   Consults (From admission, onward)        Status Ordering Provider     Inpatient consult to [facility]  Once        Provider:  Louis Sousa MD    Completed LIZZY TAYLOR          No new Assessment & Plan " notes have been filed under this hospital service since the last note was generated.  Service: Hospital Medicine    Final Active Diagnoses:    Diagnosis Date Noted POA    PRINCIPAL PROBLEM:  New onset seizure [R56.9] 08/14/2022 Yes    Urinary tract infection without hematuria [N39.0] 08/15/2022 Yes    Encephalopathy [G93.40]  Unknown    Hypothyroidism [E03.9] 08/14/2022 Yes    HTN (hypertension) [I10] 03/25/2020 Yes    Nicotine use disorder [F17.200] 03/25/2020 Yes    Major depressive disorder, recurrent, severe with psychotic features [F33.3]  Yes    Severe benzodiazepine use disorder [F13.20]  Yes    Cannabis use disorder, severe, dependence [F12.20]  Yes    Opioid use disorder, severe, dependence [F11.20]  Yes      Problems Resolved During this Admission:       Discharged Condition: good    Disposition: Home or Self Care    Follow Up:    Patient Instructions:      Ambulatory referral/consult to Neurology   Standing Status: Future   Referral Priority: Routine Referral Type: Consultation   Referral Reason: Specialty Services Required   Requested Specialty: Neurology   Number of Visits Requested: 1     Ambulatory referral[facility]consult to [facility]   Standing Status: Future   Referral Priority: Routine Referral Type: Consultation   Referral Reason: Specialty Services Required   Number of Visits Requested: 1     Diet Adult Regular     Notify your health care provider if you experience any of the following:  temperature >100.4     Notify your health care provider if you experience any of the following:  persistent nausea and vomiting or diarrhea     Notify your health care provider if you experience any of the following:  severe uncontrolled pain     Notify your health care provider if you experience any of the following:  difficulty breathing or increased cough     Notify your health care provider if you experience any of the following:  severe persistent headache     Notify your health care provider if you  experience any of the following:  persistent dizziness, light-headedness, or visual disturbances     Notify your health care provider if you experience any of the following:  increased confusion or weakness     Activity as tolerated       Significant Diagnostic Studies: see above    Pending Diagnostic Studies:     Procedure Component Value Units Date/Time    Hemoglobin A1c [[id number]] Collected: 08/15/22 0504    Order Status: Sent Lab Status: In process Updated: 08/15/22 0554    Specimen: Blood          Medications:  Reconciled Home Medications:      Medication List      START taking these medications    levothyroxine 112 MCG tablet  Commonly known as: SYNTHROID  Take 1 tablet (112 mcg total) by mouth before breakfast.  Start taking on: August 17, 2022        CONTINUE taking these medications    ALPRAZolam 1 MG tablet  Commonly known as: XANAX  Take 1 mg by mouth 3 (three) times daily.     amoxicillin-clavulanate 875-125mg 875-125 mg per tablet  Commonly known as: AUGMENTIN  Take 1 tablet by mouth 2 (two) times daily. for 5 days        STOP taking these medications    zolpidem 10 mg Tab  Commonly known as: AMBIEN            Indwelling Lines/Drains at time of discharge:   Lines/Drains/Airways     None                 Time spent on the discharge of patient: 35 minutes         Vinnie Sandoval MD  Department of Hospital Medicine  [facility]

## 2022-08-16 NOTE — PROGRESS NOTES
Smoking cessation education note: Pt is a 0.5 pk/day cigarette/cigar smoker x 25 yrs. Order obtained for 14 mg nicotine patch Q day. Pt states that she switched to vaping w/ nicotine approx. 8 months ago. Discussed the dangers of vaping, smoking, and nicotine addiction, as well as strategies used by smoking cessation program. Pt declines referral to [facility] Cessation clinic at this time. Handout provided.

## 2022-08-16 NOTE — PLAN OF CARE
D/c orders noted, no DME, no HH.     Sw met with pt and pt's uncle to discuss d/c planning. Sw informed pt and pt's uncle of upcoming follow up appointments. Pt and pt's uncle verbalized understanding of all. Pt's uncle will transport pt home at the time of d/c.     Pt is cleared to go from CM standpoint.     Future Appointments   Date Time Provider Department Center   8/17/2022  2:00 PM [facility]         08/16/22 1139   Final Note   Assessment Type Final Discharge Note   Anticipated Discharge Disposition Home   What phone number can be called within the next 1-3 days to see how you are doing after discharge? [phone]   Hospital Resources/Appts/Education Provided Appointments scheduled by Navigator/Coordinator   Post-Acute Status   Coverage Medicaid   Discharge Delays None known at this time

## 2022-08-16 NOTE — PROGRESS NOTES
[facility] Progress Note    Sabina Whitten  1985  [id number]      HPI:  Sabina Whitten is a 37 y.o. w/ Hx of  polysubstance abuse, hypertension, and anxiety who presented to the ED for evaluation for seizures. Per ED notes patient had been acting abnormal and having seizures for past few days as reported by her fiance before he was escorted out of the hospital. Patient is disoriented and a poor historian. She denies history of seizures though chart review noted it in 2021. She says she was watching TV then fell and denies use of drugs though current and previous UDS reports positive for benzo (which she reports taking xanax), THC (she currently denies) and amphetamin. Patient oriented to self and place in the ED, disoriented to situation and states it is August of 1999. She was lethargic, however, responded easily to verbal stimulation. She reported taking lisinopril for her hypertension though not aware of dosage and chart review mentions she takes propranolol.      In the ED: labs remarkable for TSH 26.787, free T4 0.27, Na 135, WBC 14.45, T bili 1.3. Witnessed seizure in waiting area. Patient given benadryl, haldol, and ativan x1. Given Keppra IV load (1000mg). Patient was also noted to be hypertensive initially with  then normotensive. CT maxillofacial: No acute maxillofacial fracture. CXR: no acute abnormality. CTH: no acute intracranial abnormality. Admitted to [facility] for further care and neurology work up. Patient started on Keppra 500mg BID by medicine team.     Today, patient found resting after EEG. Difficult to obtain additional history as patient sleeping and intermittently ignoring examiner. Patient eventually reported that she has had seizures for the last several days. She is unsure what her seizures look like and is unable to remember events surrounding the seizures. She states that she has no prior history of seizures or family history. Denies recent illness or  sick contacts. Denies substance abuse. Denies increased stressors at home.    Subjective:   Today, patient more awake and alert and able to fully participate in exam. Mother at bedside who corroborates previous story of seizures. Mother additionally noted that patient had history of seizures in her teenage years as well as perinatally (denied history of eclampsia related seizures, however). Regardless, patient appears to be at or near her baseline. She has not had further seizure activity during this admission.     Objective:  Vitals:    08/16/22 0809   BP: (!) 134/95   Pulse: 79   Resp: 18   Temp: 97.1 °F (36.2 °C)     Scheduled Meds:   cefTRIAXone (ROCEPHIN) IVPB  1 g Intravenous Q24H    levetiracetam IV  750 mg Intravenous Q12H    levothyroxine  112 mcg Oral Before breakfast    sodium chloride 0.9%  10 mL Intravenous Q8H       Neurologic Physical Examination:   Orientation  Alert, awake, oriented to self, place, time, and situation.  Memory  Recent and remote memory intact.  Language  No dysarthria, No aphasia.   Cranial Nerves  PERRL, VF intact, EOMI, V1-V3 intact, symmetric facial expression, hearing grossly intact, SCM & TPZ 5/5, tongue midline, symmetric palate elevation.  Motor  Normal Bulk  Normal Tone  5/5 strength in 4 extremities  Sensory  Normal to light touch throughout  Normal vibration and proprioception  Romberg Negative  DTR   +2 symmetric  Cerebellar/Gait  Normal finger to nose  Gait deferred    Laboratory Findings:   Recent Results (from the past 24 hour(s))   CBC with Automated Differential    Collection Time: 08/16/22  5:37 AM   Result Value Ref Range    WBC 8.51 3.90 - 12.70 K/uL    RBC 3.78 (L) 4.00 - 5.40 M/uL    Hemoglobin 13.7 12.0 - 16.0 g/dL    Hematocrit 39.9 37.0 - 48.5 %     (H) 82 - 98 fL    MCH 36.2 (H) 27.0 - 31.0 pg    MCHC 34.3 32.0 - 36.0 g/dL    RDW 13.9 11.5 - 14.5 %    Platelets 365 150 - 450 K/uL    MPV 9.0 (L) 9.2 - 12.9 fL    Immature Granulocytes 0.4 0.0 - 0.5  %    Gran # (ANC) 4.3 1.8 - 7.7 K/uL    Immature Grans (Abs) 0.03 0.00 - 0.04 K/uL    Lymph # 3.2 1.0 - 4.8 K/uL    Mono # 0.7 0.3 - 1.0 K/uL    Eos # 0.2 0.0 - 0.5 K/uL    Baso # 0.07 0.00 - 0.20 K/uL    nRBC 0 0 /100 WBC    Gran % 50.2 38.0 - 73.0 %    Lymph % 37.8 18.0 - 48.0 %    Mono % 8.7 4.0 - 15.0 %    Eosinophil % 2.1 0.0 - 8.0 %    Basophil % 0.8 0.0 - 1.9 %    Differential Method Automated    Comprehensive Metabolic Panel (CMP)    Collection Time: 08/16/22  5:38 AM   Result Value Ref Range    Sodium 137 136 - 145 mmol/L    Potassium 3.4 (L) 3.5 - 5.1 mmol/L    Chloride 106 95 - 110 mmol/L    CO2 21 (L) 23 - 29 mmol/L    Glucose 86 70 - 110 mg/dL    BUN 8 6 - 20 mg/dL    Creatinine 0.8 0.5 - 1.4 mg/dL    Calcium 8.8 8.7 - 10.5 mg/dL    Total Protein 6.6 6.0 - 8.4 g/dL    Albumin 3.7 3.5 - 5.2 g/dL    Total Bilirubin 0.9 0.1 - 1.0 mg/dL    Alkaline Phosphatase 53 (L) 55 - 135 U/L    AST 18 10 - 40 U/L    ALT 12 10 - 44 U/L    Anion Gap 10 8 - 16 mmol/L    eGFR >60 >60 mL/min/1.73 m^2   Magnesium    Collection Time: 08/16/22  5:38 AM   Result Value Ref Range    Magnesium 1.8 1.6 - 2.6 mg/dL       Neuroimaging:   CT Head Without Contrast    Result Date: 8/14/2022  EXAMINATION: CT HEAD WITHOUT CONTRAST CLINICAL HISTORY: AMS; TECHNIQUE: Low dose axial CT images obtained throughout the head without intravenous contrast. Sagittal and coronal reconstructions were performed. COMPARISON: CT head 03/24/2020. FINDINGS: Ventricles and sulci are normal in size for age without evidence of hydrocephalus. No extra-axial blood or fluid collections.  The brain parenchyma is normal. No parenchymal mass, hemorrhage, edema or major vascular distribution infarct. No calvarial fracture.  The scalp is unremarkable.  Bilateral paranasal sinuses and mastoid air cells are clear.     No acute intracranial abnormality. Electronically signed by: Ford Brewer Date:    08/14/2022 Time:    19:11    CT Maxillofacial Without  Contrast    Result Date: 8/14/2022  EXAMINATION: CT MAXILLOFACIAL WITHOUT CONTRAST CLINICAL HISTORY: AMS; TECHNIQUE: Low dose axial images, sagittal and coronal reformations were obtained through the face without intravenous contrast.  Examination was repeated due to motion artifact of the mandible. COMPARISON: CT head from 03/24/2020. FINDINGS: There is no acute fracture or dislocation of the maxillofacial structures. The nasal bones are intact. The nasal septum approximates midline. The mandible is intact and not dislocated. The temporomandibular joints are unremarkable. There are scattered dental caries and periapical lucencies, compatible with endodontal disease. The orbits are unremarkable. The bilateral globes are symmetric and intact. There is no preseptal or post-septal fluid or hemorrhage. The paranasal sinuses and mastoid air cells are clear. The soft tissues of the face are unremarkable. Partially imaged portions of the cervical spine are intact.     No acute maxillofacial fracture. Electronically signed by: Ford Brewer Date:    08/14/2022 Time:    19:26    Assessment/Plan:   Sabina Whitten is a 37 y.o. w/ Hx of polysubstance abuse, hypertension, and anxiety who presented to the ED for evaluation for seizures. Patient's fiance reported multiple suspected seizures over the last several days. Patient with one witnessed event in the ED. Labs concerning for hypothyroidism, THC/Amphetamines on drug screen, and U/A with possible UTI. EEG with slow and disorganized background consistent with diffuse cortical dysfunction and a mild-moderate encephalopathy, and no epileptiform discharges and no electrographic seizures. Suspect provoked seizure in setting of above mentioned abnormalities.     - s/p 1g Keppra load  - Continue 750mg BID  - EEG with slow and disorganized background consistent with diffuse cortical dysfunction and a mild-moderate encephalopathy, and no epileptiform discharges and no electrographic  seizures  - MRI Brain Epilepsy Protocol -- pending read  - UTI management per primary team, avoiding antibiotics that can reduce seizure threshold -- WBC normalizing, afebrile  - Patient appears at baseline currently  - Patient likely pending discharge later today  - Please have patient follow-up with outpatient neurology following discharge     Case discussed with Dr. Krause     Will continue to follow.    Louis Sousa MD  [facility] PGY-IV  [facility] Consult Service

## 2022-08-16 NOTE — HOSPITAL COURSE
"Ms. Whitten presents with seizures. Labs concerning for hypothyroidism, THC/Amphetamines on drug screen, and U/A with UTI. EEG with slow and disorganized background consistent with diffuse cortical dysfunction and a mild-moderate encephalopathy, and no epileptiform discharges and no electrographic seizures. MRI without acute findings. She was monitored in house without any further seizure activity.Treated with antibiotics and started on keppra. Discussed seizure precautions with patient and family over the phone.    BP (!) 158/96 (Patient Position: Lying)   Pulse 86   Temp 97.1 °F (36.2 °C) (Oral)   Resp 18   Ht 5' 3" (1.6 m)   Wt 60.5 kg (133 lb 6.1 oz)   SpO2 99%   Breastfeeding No   BMI 23.63 kg/m²   Physical Exam  Vitals and nursing note reviewed.   Constitutional:       General: She is sleeping.      Appearance: She is not ill-appearing or diaphoretic.   HENT:      Head: Normocephalic and atraumatic.      Mouth/Throat:      Mouth: Mucous membranes are dry.   Eyes:      Pupils: Pupils are equal, round, and reactive to light.   Cardiovascular:      Rate and Rhythm: Normal rate and regular rhythm.      Pulses: Normal pulses.      Heart sounds: Normal heart sounds. No murmur heard.  Pulmonary:      Effort: Pulmonary effort is normal. No respiratory distress.   Abdominal:      General: Bowel sounds are normal. There is no distension.      Palpations: Abdomen is soft.      Tenderness: There is no abdominal tenderness.   Musculoskeletal:         General: No swelling.      Cervical back: Normal range of motion.   Skin:     General: Skin is warm and dry.      Capillary Refill: Capillary refill takes 2 to 3 seconds.   Neurological:      Mental Status: She is easily aroused. She is disoriented.   Psychiatric:         Behavior: Behavior normal.   "

## 2022-08-17 ENCOUNTER — OFFICE VISIT (OUTPATIENT)
Dept: FAMILY MEDICINE | Facility: HOSPITAL | Age: 37
DRG: 101 | End: 2022-08-17
Attending: FAMILY MEDICINE
Payer: MEDICAID

## 2022-08-17 VITALS
WEIGHT: 133.19 LBS | HEIGHT: 63 IN | HEART RATE: 102 BPM | DIASTOLIC BLOOD PRESSURE: 127 MMHG | SYSTOLIC BLOOD PRESSURE: 189 MMHG | BODY MASS INDEX: 23.6 KG/M2

## 2022-08-17 DIAGNOSIS — I10 PRIMARY HYPERTENSION: Primary | ICD-10-CM

## 2022-08-17 DIAGNOSIS — R56.9 SEIZURE: ICD-10-CM

## 2022-08-17 DIAGNOSIS — E03.9 HYPOTHYROIDISM, UNSPECIFIED TYPE: ICD-10-CM

## 2022-08-17 PROCEDURE — 99213 OFFICE O/P EST LOW 20 MIN: CPT | Performed by: PHYSICIAN ASSISTANT

## 2022-08-17 RX ORDER — AMLODIPINE BESYLATE 5 MG/1
5 TABLET ORAL DAILY
Qty: 30 TABLET | Refills: 3 | Status: SHIPPED | OUTPATIENT
Start: 2022-08-17 | End: 2023-08-17

## 2022-08-17 RX ORDER — LEVETIRACETAM 750 MG/1
750 TABLET ORAL 2 TIMES DAILY
Qty: 180 TABLET | Refills: 1 | Status: SHIPPED | OUTPATIENT
Start: 2022-08-17 | End: 2022-08-17 | Stop reason: SDUPTHER

## 2022-08-17 RX ORDER — LEVETIRACETAM 750 MG/1
750 TABLET ORAL 2 TIMES DAILY
Qty: 180 TABLET | Refills: 1 | Status: SHIPPED | OUTPATIENT
Start: 2022-08-17 | End: 2023-08-17

## 2022-08-17 NOTE — PROGRESS NOTES
Subjective:       Patient ID: Saibna Whitten is a 37 y.o. female.    Chief Complaint: Hospital Follow Up    Sabina Whitten is a 37-year-old W PMH of polysubstance abuse, HTN, and anxiety here today for hospital follow up after recent admission for seizures. Labs concerning for hypothyroidism, THC/Amphetamines on drug screen, and U/A with UTI. EEG with slow and disorganized background consistent with diffuse cortical dysfunction and a mild-moderate encephalopathy, and no epileptiform discharges and no electrographic seizures. MRI without acute findings. She was monitored without any further seizure activity.Treated with antibiotics for UTI and started on keppra.    Today she is accompanied by her Fiance, Bernardoviola Melchor. Overall she has been ok since getting discharged, but did not receive Keppra from the pharmacy (looks like it was not sent). She denies any further seizure activity, but her fiance thinks that they are going to start again soon, as she is starting to act funny. He reports that she seems to space out and not be very focused. Of note, her BP is quite elevated today. She denies any symptoms at this time. No CP, SOB, HA, vision changes. This BP is similar to when she presented to the hospital, but during her admission BP normalized.     Review of Systems   All other systems reviewed and are negative.    Past Medical History:   Diagnosis Date    Anxiety     Fractured coccyx     Hypertension    No family history on file.  Past Surgical History:   Procedure Laterality Date    none       Social History     Socioeconomic History    Marital status: Single   Tobacco Use    Smoking status: Current Some Day Smoker     Packs/day: 0.50     Years: 25.00     Pack years: 12.50     Types: Cigars, Vaping with nicotine, Cigarettes     Start date: 1997    Smokeless tobacco: Never Used    Tobacco comment: Pt switched from cigarettes/cigars to vaping with nicotine approx. 8 months ago. She declines referral to  "Ambulatory Smoking Cessation clinic at this time. Handout provided.   Substance and Sexual Activity    Alcohol use: No     Comment: social    Drug use: No    Sexual activity: Not Currently     Current Outpatient Medications   Medication Instructions    ALPRAZolam (XANAX) 1 mg, Oral, 3 times daily    amLODIPine (NORVASC) 5 mg, Oral, Daily    levETIRAcetam (KEPPRA) 750 mg, Oral, 2 times daily    levothyroxine (SYNTHROID) 112 mcg, Oral, Before breakfast     Objective:     Vitals:    08/17/22 1401   BP: (!) 189/127   Pulse: 102   Weight: 60.4 kg (133 lb 2.5 oz)   Height: 5' 3" (1.6 m)     Physical Exam  Vitals and nursing note reviewed.   Constitutional:       General: She is not in acute distress.     Appearance: She is well-developed. She is not diaphoretic.   HENT:      Head: Normocephalic and atraumatic.   Eyes:      Conjunctiva/sclera: Conjunctivae normal.   Neck:      Trachea: No tracheal deviation.   Cardiovascular:      Rate and Rhythm: Normal rate and regular rhythm.      Heart sounds: Normal heart sounds. No murmur heard.  Pulmonary:      Effort: Pulmonary effort is normal. No respiratory distress.      Breath sounds: Normal breath sounds. No wheezing.   Chest:      Chest wall: No tenderness.   Abdominal:      General: Bowel sounds are normal. There is no distension.      Palpations: Abdomen is soft.      Tenderness: There is no abdominal tenderness. There is no guarding.   Musculoskeletal:         General: No tenderness or deformity. Normal range of motion.   Skin:     General: Skin is warm and dry.   Neurological:      Mental Status: She is alert and oriented to person, place, and time.      Coordination: Coordination normal.   Psychiatric:         Behavior: Behavior normal.         Thought Content: Thought content normal.     MRI Brain  Impression:     No acute intracranial process.  The brain parenchyma maintains normal signal intensity.    EEG  Impression:   This is an abnormal awake and asleep " routine EEG because of a slow and disorganized background consistent with diffuse cortical dysfunction and a mild-moderate encephalopathy.  This finding is nonspecific with regards to etiology but can be seen in the setting of toxic/metabolic derangements, infection, and as a medication effect.  Higher frequency activity is often seen as a medication effect.  There are no prominent focal findings however encephalopathy obscures focal findings.  There are no epileptiform discharges and no electrographic seizures.    Assessment:       1. Primary hypertension    2. New onset seizure    3. Hypothyroidism, unspecified type        Plan:       Primary hypertension   -BP significantly elevated today and on repeat. This is similar to readings she has gotten in the past. We are going to start 5mg amlodipine today and see how her BP is in a week.   -     amLODIPine (NORVASC) 5 MG tablet; Take 1 tablet (5 mg total) by mouth once daily.  Dispense: 30 tablet; Refill: 3    New onset seizure   -Confirmed Keppra with discharge team and sent to pharmacy. Will  today and being. Neuro follow up scheduled.   -     Discontinue: levETIRAcetam (KEPPRA) 750 MG Tab; Take 1 tablet (750 mg total) by mouth 2 (two) times daily.  Dispense: 180 tablet; Refill: 1    Hypothyroidism, unspecified type   -Can repeat labs at follow up to confirm.   -     Ambulatory referral/consult to [facility]      Future Appointments   Date Time Provider Department Center   8/25/2022  3:20 PM Rebecca Fields MD [facility]   9/23/2022  3:00 PM Melo Pereira MD [facility]   9/30/2022 10:40 AM Rebecca Fields MD [facility]

## 2022-09-21 ENCOUNTER — TELEPHONE (OUTPATIENT)
Dept: NEUROLOGY | Facility: CLINIC | Age: 37
End: 2022-09-21
Payer: MEDICAID

## 2022-09-21 NOTE — TELEPHONE ENCOUNTER
Called and spoke to  regarding her appt with . I stated that we have to reschedule her appt to another day.     She stated that we can go ahead cancel any and all appts due to her not wanting care from [facility] anymore

## 2022-11-14 PROBLEM — N39.0 URINARY TRACT INFECTION WITHOUT HEMATURIA: Status: RESOLVED | Noted: 2022-08-15 | Resolved: 2022-11-14

## 2023-04-14 DIAGNOSIS — R56.9 SEIZURE: ICD-10-CM

## 2023-04-14 RX ORDER — LEVETIRACETAM 750 MG/1
750 TABLET ORAL 2 TIMES DAILY
Qty: 180 TABLET | Refills: 1 | Status: CANCELLED | OUTPATIENT
Start: 2023-04-14 | End: 2024-04-13